# Patient Record
Sex: FEMALE
[De-identification: names, ages, dates, MRNs, and addresses within clinical notes are randomized per-mention and may not be internally consistent; named-entity substitution may affect disease eponyms.]

---

## 2019-04-17 ENCOUNTER — HOSPITAL ENCOUNTER (EMERGENCY)
Dept: HOSPITAL 97 - ER | Age: 24
Discharge: HOME | End: 2019-04-17
Payer: COMMERCIAL

## 2019-04-17 VITALS — TEMPERATURE: 98.2 F | DIASTOLIC BLOOD PRESSURE: 70 MMHG | SYSTOLIC BLOOD PRESSURE: 129 MMHG | OXYGEN SATURATION: 100 %

## 2019-04-17 DIAGNOSIS — Z88.0: ICD-10-CM

## 2019-04-17 DIAGNOSIS — M06.9: ICD-10-CM

## 2019-04-17 DIAGNOSIS — J45.909: ICD-10-CM

## 2019-04-17 DIAGNOSIS — Z88.5: ICD-10-CM

## 2019-04-17 DIAGNOSIS — M22.12: Primary | ICD-10-CM

## 2019-04-17 PROCEDURE — 99283 EMERGENCY DEPT VISIT LOW MDM: CPT

## 2019-04-17 NOTE — EDPHYS
Physician Documentation                                                                           

 [facility]                                                                 

Name: Marisabel Felix                                                                             

Age: 23 yrs                                                                                       

Sex: Female                                                                                       

: 1995                                                                                   

MRN: [id number]                                                                                   

Arrival Date: 2019                                                                          

Time: 07:45                                                                                       

Account#: [id number]                                                                            

Bed 5                                                                                             

Private MD:                                                                                       

ED Physician Vickey Medina                                                                         

HPI:                                                                                              

                                                                                             

07:45 This 23 yrs old  Female presents to ER via Un[facility] with complaints of Knee   rn  

      Pain.                                                                                       

07:45 The patient presents with decreased range of motion, pain, that is acute. The           rn  

      complaints affect the left knee. Onset: The symptoms/episode began/occurred just prior      

      to arrival. Modifying factors: The symptoms are alleviated by straightening leg.            

      Severity of symptoms: At their worst the symptoms were moderate, in the emergency           

      department the symptoms have improved. The patient has experienced similar episodes in      

      the past. REports bending knee and twisting to get into vehicle, her left knee locked       

      up, unable to move it, no pop or blunt trauma, now improved, able to range knee now and     

      just feels sore, has happened before and reports in past able to straighten it and          

      medial pressure resolves it. .                                                              

                                                                                                  

OB/GYN:                                                                                           

07:50 LMP N/A - Birth control method                                                          jl7 

                                                                                                  

Historical:                                                                                       

- Allergies:                                                                                      

07:50 PENICILLINS;                                                                            jl7 

07:50 tramadol;                                                                               jl7 

- Home Meds:                                                                                      

07:50 None [Active];                                                                          jl7 

- PMHx:                                                                                           

07:50 Asthma; Rheumatoid Arthritis;                                                           jl7 

- PSHx:                                                                                           

07:50 None;                                                                                   jl7 

                                                                                                  

- Immunization history:: Adult Immunizations up to date.                                          

- Social history:: Smoking status: Patient/guardian denies using tobacco.                         

- Family history:: not pertinent.                                                                 

- Ebola Screening: : No symptoms or risks identified at this time.                                

- Hospitalizations: : No recent hospitalization is reported.                                      

                                                                                                  

                                                                                                  

ROS:                                                                                              

07:45 Constitutional: Negative for fever, chills, and weight loss, MS/Extremity: + left knee  rn  

      pain and locking Neuro: Negative for weakness, numbness                                     

                                                                                                  

Exam:                                                                                             

07:45 Constitutional:  This is a well developed, well nourished patient who is awake, alert,  rn  

      and in no acute distress. MS/ Extremity:  Pulses equal, no cyanosis.  Neurovascular         

      intact.  Full, normal range of motion.  Equal circumference. NO bony tenderness, no         

      patellar dislocation currently. No palpable cord.                                           

                                                                                                  

Vital Signs:                                                                                      

07:50  / 70; Pulse 85; Resp 16 S; Temp 98.2(O); Pulse Ox 100% on R/A; Weight 70.31 kg   jl7 

      (R); Height 5 ft. 5 in. (165.10 cm) (R); Pain 0/10;                                         

07:50 Body Mass Index 25.79 (70.31 kg, 165.10 cm)                                             jl7 

                                                                                                  

MDM:                                                                                              

07:45 Patient medically screened.                                                             rn  

07:45 Differential diagnosis: dislocation, tendonitis, patella dislocation/subluxation. Data  rn  

      reviewed: vital signs, nurses notes, and as a result, I will discharge patient.             

      Counseling: I had a detailed discussion with the patient and/or guardian regarding: the     

      historical points, exam findings, and any diagnostic results supporting the                 

      discharge/admit diagnosis, the need for outpatient follow up, to return to the              

      emergency department if symptoms worsen or persist or if there are any questions or         

      concerns that arise at home. Response to treatment: the patient's symptoms have             

      markedly improved after treatment. Special discussion: I discussed with the                 

      patient/guardian in detail that at this point there is no indication for admission to       

      the hospital. It is understood, however, that if the symptoms persist or worsen the         

      patient needs to return immediately for re-evaluation. Based on the history and exam        

      findings, there is no indication for further emergent testing or inpatient evaluation.      

      I discussed with the patient/guardian the need to see the orthopedic surgeon for            

      further evaluation of the symptoms. ED course: Most likely subluxation of patella,          

      given repeated episodes and improvement with straightening leg and medial pressure, no      

      subluxation now, no trauma to suggest bony injury, discussed with patient that xray         

      does not evaluate structures other than bone, and she declines xray. Suggest knee brace     

      for patellar stabilization and ortho f/u. .                                                 

                                                                                                  

Administered Medications:                                                                         

No medications were administered                                                                  

                                                                                                  

                                                                                                  

Disposition:                                                                                      

19 07:50 Discharged to Home. Impression: Recurrent subluxation of patella, left knee.       

- Condition is Stable.                                                                            

- Discharge Instructions: Patellar Dislocation.                                                   

                                                                                                  

- Medication Reconciliation Form, Thank You Letter, Antibiotic Education, Prescription            

  Opioid Use form.                                                                                

- Follow up: Evan Arcos MD; When: As needed; Reason: Recheck today's complaints,              

  Re-evaluation by your physician.                                                                

- Problem is new.                                                                                 

- Symptoms have improved.                                                                         

                                                                                                  

                                                                                                  

                                                                                                  

Signatures:                                                                                       

Vickey Medina MD MD rn Leal, Jahala, RN                        RN   jl7                                                  

                                                                                                  

Corrections: (The following items were deleted from the chart)                                    

07:57 07:50 2019 07:50 Discharged to Home. Impression: Recurrent subluxation of         jl7 

      patella, left knee. Condition is Stable. Forms are Medication Reconciliation Form,          

      Thank You Letter, Antibiotic Education, Prescription Opioid Use. Follow up: Dr. Evan Acros; When: As needed; Reason: Recheck today's complaints, Re-evaluation by your            

      physician. Problem is new. Symptoms have improved. rn                                       

                                                                                                  

**************************************************************************************************

## 2019-04-17 NOTE — ER
Nurse's Notes                                                                                     

 [facility]                                                                 

Name: Marisabel Felix                                                                             

Age: 23 yrs                                                                                       

Sex: Female                                                                                       

: 1995                                                                                   

MRN: [id number]                                                                                   

Arrival Date: 2019                                                                          

Time: 07:45                                                                                       

Account#: [id number]                                                                            

Bed 5                                                                                             

Private MD:                                                                                       

Diagnosis: Recurrent subluxation of patella, left knee                                            

                                                                                                  

Presentation:                                                                                     

                                                                                             

07:48 Presenting complaint: EMS states: Pt getting into car and left knee locked up.          jl7 

      Transition of care: patient was not received from another setting of care. Onset of         

      symptoms was 2019. Risk Assessment: Do you want to hurt yourself or someone       

      else? Patient reports no desire to harm self or others. Initial Sepsis Screen: Does the     

      patient meet any 2 criteria? No. Patient's initial sepsis screen is negative. Does the      

      patient have a suspected source of infection? No. Patient's initial sepsis screen is        

      negative. Care prior to arrival: None.                                                      

07:48 Method Of Arrival: EMS: [facility]                                                       jl7 

07:48 Acuity: HALIE 4                                                                           jl7 

                                                                                                  

Triage Assessment:                                                                                

07:50 General: Appears in no apparent distress. uncomfortable, Behavior is calm, cooperative, jl7 

      appropriate for age. Pain: Denies pain. EENT: No signs and/or symptoms were reported        

      regarding the EENT system. Neuro: Level of Consciousness is awake, alert, obeys             

      commands, Oriented to person, place, time, situation. Cardiovascular: Patient's skin is     

      warm and dry. Respiratory: Airway is patent Respiratory effort is even, unlabored,          

      Respiratory pattern is regular, symmetrical. GI: No signs and/or symptoms were reported     

      involving the gastrointestinal system. : No signs and/or symptoms were reported           

      regarding the genitourinary system. Derm: Skin is pink, warm \T\ dry. Musculoskeletal:      

      Range of motion: limited in left knee.                                                      

                                                                                                  

OB/GYN:                                                                                           

07:50 LMP N/A - Birth control method                                                          jl7 

                                                                                                  

Historical:                                                                                       

- Allergies:                                                                                      

07:50 PENICILLINS;                                                                            jl7 

07:50 tramadol;                                                                               jl 

- Home Meds:                                                                                      

07:50 None [Active];                                                                          jl 

- PMHx:                                                                                           

07:50 Asthma; Rheumatoid Arthritis;                                                           jl7 

- PSHx:                                                                                           

07:50 None;                                                                                   jl7 

                                                                                                  

- Immunization history:: Adult Immunizations up to date.                                          

- Social history:: Smoking status: Patient/guardian denies using tobacco.                         

- Family history:: not pertinent.                                                                 

- Ebola Screening: : No symptoms or risks identified at this time.                                

- Hospitalizations: : No recent hospitalization is reported.                                      

                                                                                                  

                                                                                                  

Screenin:52 Abuse screen: Denies threats or abuse. Denies injuries from another. Nutritional        jl7 

      screening: No deficits noted. Tuberculosis screening: No symptoms or risk factors           

      identified. Fall Risk Gait- Impaired (20 pts.). Total Flower Fall Scale indicates No         

      Risk (0-24 pts).                                                                            

                                                                                                  

Assessment:                                                                                       

07:52 General: See triage assessment.                                                         jl7 

                                                                                                  

Vital Signs:                                                                                      

07:50  / 70; Pulse 85; Resp 16 S; Temp 98.2(O); Pulse Ox 100% on R/A; Weight 70.31 kg   jl7 

      (R); Height 5 ft. 5 in. (165.10 cm) (R); Pain 0/10;                                         

07:50 Body Mass Index 25.79 (70.31 kg, 165.10 cm)                                             jl7 

                                                                                                  

ED Course:                                                                                        

07:45 Patient arrived in ED.                                                                  aa5 

07:45 Vickey Medina MD is Attending Physician.                                                rn  

07:48 Faye Maier, RN is Primary Nurse.                                                      jl7 

07:49 Triage completed.                                                                       jl7 

07:50 Evan Arcos MD is Referral Physician.                                                rn  

07:50 Arm band placed on right wrist.                                                         jl7 

07:52 Patient has correct armband on for positive identification. Bed in low position. Call   jl7 

      light in reach. Side rails up X 1. Pulse ox on. NIBP on.                                    

07:53 No provider procedures requiring assistance completed. Patient did not have IV access   jl7 

      during this emergency room visit.                                                           

                                                                                                  

Administered Medications:                                                                         

No medications were administered                                                                  

                                                                                                  

                                                                                                  

Outcome:                                                                                          

07:50 Discharge ordered by MD.                                                                rn  

07:57 Discharged to home ambulatory.                                                          jl7 

07:57 Condition: stable                                                                           

07:57 Discharge instructions given to patient, Instructed on discharge instructions, follow       

      up and referral plans. Demonstrated understanding of instructions, follow-up care.          

07:57 Patient left the ED.                                                                    jl7 

                                                                                                  

Signatures:                                                                                       

Vickey Medina MD MD rn Calderon, Audri, RN RN   aa5                                                  

Faye Maier RN RN   jl7                                                  

                                                                                                  

**************************************************************************************************

## 2019-04-17 NOTE — XMS REPORT
Patient Summary Document

 Created on:2019



Patient:DHEERAJ MELARA

Sex:Female

:1995

External Reference #:[id number]





Demographics







 Address  [address] [place] [id number]

 

 Home Phone  [phone]

 

 Work Phone  [phone]

 

 Email Address  [email or web address]

 

 Preferred Language  Unknown

 

 Marital Status  Unknown

 

 Synagogue Affiliation  Unknown

 

 Race  Unknown

 

 Additional Race(s)  Unavailable

 

 Ethnic Group  Unknown









Author







 Organization  [facility]

 

 Address  [address] [place] [address] [address]. Doss [address] [place] [id number]

 

 Phone  [phone]









Care Team Providers







 Name  Role  Phone

 

 Unavailable  Unavailable  Unavailable









Problems

This patient has no known problems.



Allergies, Adverse Reactions, Alerts

This patient has no known allergies or adverse reactions.



Medications

This patient has no known medications.

## 2022-02-18 ENCOUNTER — HOSPITAL ENCOUNTER (EMERGENCY)
Dept: HOSPITAL 97 - ER | Age: 27
Discharge: HOME | End: 2022-02-18
Payer: COMMERCIAL

## 2022-02-18 VITALS — SYSTOLIC BLOOD PRESSURE: 117 MMHG | DIASTOLIC BLOOD PRESSURE: 89 MMHG

## 2022-02-18 VITALS — TEMPERATURE: 97.9 F

## 2022-02-18 VITALS — OXYGEN SATURATION: 100 %

## 2022-02-18 DIAGNOSIS — Z88.5: ICD-10-CM

## 2022-02-18 DIAGNOSIS — Z88.0: ICD-10-CM

## 2022-02-18 DIAGNOSIS — Z3A.00: ICD-10-CM

## 2022-02-18 DIAGNOSIS — O99.419: Primary | ICD-10-CM

## 2022-02-18 DIAGNOSIS — I47.1: ICD-10-CM

## 2022-02-18 LAB
BUN BLD-MCNC: 9 MG/DL (ref 7–18)
GLUCOSE SERPLBLD-MCNC: 71 MG/DL (ref 74–106)
HCT VFR BLD CALC: 37 % (ref 36–45)
LYMPHOCYTES # SPEC AUTO: 2.2 K/UL (ref 0.7–4.9)
PMV BLD: 9 FL (ref 7.6–11.3)
POTASSIUM SERPL-SCNC: 3.9 MMOL/L (ref 3.5–5.1)
RBC # BLD: 4.55 M/UL (ref 3.86–4.86)
TSH SERPL DL<=0.05 MIU/L-ACNC: 4.15 UIU/ML (ref 0.36–3.74)

## 2022-02-18 PROCEDURE — 96374 THER/PROPH/DIAG INJ IV PUSH: CPT

## 2022-02-18 PROCEDURE — 96361 HYDRATE IV INFUSION ADD-ON: CPT

## 2022-02-18 PROCEDURE — 99285 EMERGENCY DEPT VISIT HI MDM: CPT

## 2022-02-18 PROCEDURE — 80048 BASIC METABOLIC PNL TOTAL CA: CPT

## 2022-02-18 PROCEDURE — 84439 ASSAY OF FREE THYROXINE: CPT

## 2022-02-18 PROCEDURE — 93005 ELECTROCARDIOGRAM TRACING: CPT

## 2022-02-18 PROCEDURE — 36415 COLL VENOUS BLD VENIPUNCTURE: CPT

## 2022-02-18 PROCEDURE — 85025 COMPLETE CBC W/AUTO DIFF WBC: CPT

## 2022-02-18 PROCEDURE — 84443 ASSAY THYROID STIM HORMONE: CPT

## 2022-02-18 NOTE — ER
Nurse's Notes                                                                                     

 [facility]                                                                 

Name: Marisabel Felix                                                                             

Age: 26 yrs                                                                                       

Sex: Female                                                                                       

: 1995                                                                                   

MRN: [id number]                                                                                   

Arrival Date: 2022                                                                          

Time: 10:47                                                                                       

Account#: [id number]                                                                            

Bed 2                                                                                             

Private MD:                                                                                       

Diagnosis: Supraventricular tachycardia                                                           

                                                                                                  

Presentation:                                                                                     

                                                                                             

10:47 Chief complaint: Patient states: Fast heart rate. Pt reports she has had 3 or so        ss  

      episodes of having a fast heart rate, but it would go away after laying down                

      momentarily, but this time is not going away. Coronavirus screen: Client denies travel      

      out of the [place] in the last 14 days. Ebola Screen: Patient denies exposure to               

      infectious person. Patient denies travel to an Ebola-affected area in the 21 days           

      before illness onset. Initial Sepsis Screen: Does the patient meet any 2 criteria? RR >     

      20 per min. HR > 90 bpm. No. Patient's initial sepsis screen is negative. Does the          

      patient have a suspected source of infection? No. Patient's initial sepsis screen is        

      negative. Risk Assessment: Do you want to hurt yourself or someone else? Patient            

      reports no desire to harm self or others. Onset of symptoms was 2022.          

10:47 Method Of Arrival: Ambulatory                                                           ss  

10:47 Acuity: HALIE 2                                                                           ss  

                                                                                                  

OB/GYN:                                                                                           

13:12 LMP N/A - currently pregnant                                                            ke1 

                                                                                                  

Historical:                                                                                       

- Allergies:                                                                                      

11:00 PENICILLINS;                                                                            ss  

11:00 tramadol;                                                                               ss  

- Home Meds:                                                                                      

11:00 None [Active];                                                                          ss  

- PMHx:                                                                                           

11:00 Asthma; Rheumatoid Arthritis;                                                           ss  

- PSHx:                                                                                           

11:00 None;                                                                                   ss  

                                                                                                  

- Immunization history:: Client reports having NOT received the Covid vaccine.                    

- Social history:: Smoking status: Patient denies any tobacco usage or history of.                

- Family history:: not pertinent.                                                                 

- Hospitalizations: : No recent hospitalization is reported.                                      

                                                                                                  

                                                                                                  

Screenin:11 Abuse screen: Denies threats or abuse. Nutritional screening: No deficits noted.        ke1 

      Tuberculosis screening: No symptoms or risk factors identified. Fall Risk None              

      identified.                                                                                 

                                                                                                  

Assessment:                                                                                       

11:09 General: Appears uncomfortable, Behavior is calm, cooperative, quiet. Neuro: No         ke1 

      deficits noted.                                                                             

11:09 Cardiovascular: Reports palpitations, fast heart rate Capillary refill < 3 seconds      ke1 

      Patient's skin is warm and dry. Pulses are all present. Rhythm is SVT. Respiratory:         

      Airway is patent. GI: Abdomen is round pregnant. :. Derm: Skin is intact.                 

      Musculoskeletal: Circulation, motion, and sensation intact.                                 

11:09 Pain: Denies pain.                                                                      ke1 

11:10 Cardiovascular: Rhythm is SVT.                                                          ke1 

11:17 Cardiovascular: Rhythm is sinus rhythm.                                                 ke1 

11:34 Neuro: No deficits noted.                                                               ke1 

11:34 Reassessment: Patient states feeling better. Patient states symptoms have improved.     ke1 

      Cardiovascular: Rhythm is sinus rhythm.                                                     

12:25 Reassessment: No changes from previously documented assessment.                         ke1 

12:50 Reassessment: No changes from previously documented assessment. Patient and/or family   jd3 

      updated on plan of care and expected duration. Pain level reassessed. Patient is alert,     

      oriented x 3, equal unlabored respirations, skin warm/dry/pink.                             

                                                                                                  

Vital Signs:                                                                                      

10:47  / 72; Pulse 180; Resp 22; Temp 97.9(TE); Pulse Ox 100% on R/A; Weight 72.57 kg;  ss  

      Height 5 ft. 5 in. (165.10 cm); Pain 0/10;                                                  

11:11  / 86; Pulse 171;                                                                 ke1 

11:12  / 93; Pulse 122; Resp 20; Pulse Ox 100% ;                                        ke1 

11:13  / 79; Pulse 94;                                                                  ke1 

11:13  / 85; Pulse 102; Resp 19; Pulse Ox 100% on R/A;                                  ke1 

12:06  / 87; Pulse 75; Resp 18;                                                         ke1 

12:50  / 89; Pulse 78; Resp 16 S; Pulse Ox 99% on R/A;                                  jd3 

13:03  / 89; Pulse 83; Resp 16; Pulse Ox 100% on R/A;                                   ke1 

10:47 Body Mass Index 26.63 (72.57 kg, 165.10 cm)                                               

                                                                                                  

Vitals:                                                                                           

11:14 Fetal Heart Tones 154.                                                                  ke1 

12:25 Cardiac Rhythm Assessment Sinus rhythm.                                                 ke1 

                                                                                                  

ED Course:                                                                                        

10:47 Patient arrived in ED.                                                                  mr  

10:48 Vickey Medina MD is Attending Physician.                                                rn  

10:59 EKG done, by ED staff, reviewed by Vickey Medina MD.                                      em1 

11:00 Triage completed.                                                                       ss  

11:00 Ebrottie, Kouassi, RN is Primary Nurse.                                                 ke1 

11:01 Inserted saline lock: 20 gauge in left antecubital area, using aseptic technique.       ke1 

11:09 Arm band placed on right wrist.                                                         ke1 

13:11 No provider procedures requiring assistance completed. IV discontinued.                 ke1 

13:11 Patient has correct armband on for positive identification.                             ke1 

                                                                                                  

Administered Medications:                                                                         

11:11 Drug: NS 0.9% 1000 ml Route: IV; Rate: 1000 ml; Site: left antecubital;                 ke1 

12:04 Follow up: IV Status: Completed infusion; IV Intake: 500ml                              ke1 

11:11 Drug: Adenosine 6 mg Route: IVP; Site: left antecubital;                                ke1 

11:13 Follow up:  / 79; Pulse 94 bpm; Response: Marked relief of symptoms; Other        ke1 

11:30 CANCELLED (Other Intervention Used): Adenosine 12 mg IVP once                           ke1 

                                                                                                  

                                                                                                  

Intake:                                                                                           

12:04 IV: 500ml; Total: 500ml.                                                                ke1 

                                                                                                  

Outcome:                                                                                          

12:57 Discharge ordered by MD.                                                                rn  

13:11 Discharged to home ambulatory.                                                          ke1 

13:11 Condition: improved                                                                         

13:11 Discharge instructions given to patient.                                                    

13:16 Patient left the ED.                                                                    ke1 

                                                                                                  

Signatures:                                                                                       

Jina Munoz Roman, MD MD   rn                                                   

Alejandro Marina Shelby, RN RN ss Davies, Jonathon, RN RN jd3 Ebrottie, Kouassi, RN                   RN   ke1                                                  

                                                                                                  

Corrections: (The following items were deleted from the chart)                                    

11:02 11:01 Inserted saline lock: 22 gauge in left antecubital area, using aseptic technique. ke1 

      ke1                                                                                         

11:30 11:11 Adenosine 6 mg IVP in left antecubital ke1                                        ke1 

11:41 11:09 Cardiovascular: Reports ke1                                                       ke1 

13:12 11:00 Arm band placed on right wrist. ss                                                ke1 

                                                                                                  

**************************************************************************************************

## 2022-02-18 NOTE — XMS REPORT
Continuity of Care Document

                          Created on:2022



Patient:DHEERAJ MELARA

Sex:Female

:1995

External Reference #:[id number]





Demographics







                          Address                   [address] [place] [address]

 

                          Home Phone                [phone]

 

                          Work Phone                [phone]

 

                          Mobile Phone              [phone]

 

                          Email Address             [email or web address]

 

                          Preferred Language        en

 

                          Marital Status            Unknown

 

                          Episcopalian Affiliation     Unknown

 

                          Race                      Unknown

 

                          Additional Race(s)        White

 

                          Ethnic Group              Unknown









Author







                          Organization              [facility]

t

 

                          Address                   [address]



                                                    [place] [address]

 

                          Phone                     [phone]









Support







                Name            Relationship    Address         Phone

 

                Hever      Mother          [address]    [phone]



                                                [place] [address] 

 

                Dwyer        Life Partner    [phone]

[phone]



                                                [place] [address] 









Care Team Providers







                    Name                Role                Phone

 

                    LIBBY Brooke Jr.      Primary Care Physician [phone]

 

                    CAROLINE ADAMES           Attending Clinician Unavailable

 

                    Caroline Adames MD        Attending Clinician [phone]

 

                    Pob,  Lab Main      Attending Clinician Unavailable

 

                    Jazlyn CHING       Attending Clinician [phone]

 

                    Ultrasound          Attending Clinician Unavailable

 

                    INNA Andrade MD      Attending Clinician [phone]

 

                    INNA ANDRADE         Attending Clinician Unavailable









Payers







           Payer Name Policy Type Policy Number Effective Date Expiration Date S

ourjenn

 

           [facility]            [id number]  2021-10-01 00:00:00            







Problems







       Condition Condition Condition Status Onset  Resolution Last   Treating Co

mments 

Source



       Name   Details Category        Date   Date   Treatment Clinician        



                                                 Date                 

 

       High risk High risk Disease Active                              Uni

vers



       pregnancy, pregnancy,               1-07                               it

y of



       antepartum antepartum               00:00:                             [place]



                                   00                                 [facility]



                                                                      Branch

 

       History of History of Disease Active 2021                             U

nivers



       anxiety anxiety               0-15                               ity of



                                   00:00:                             [place]



                                   00                                 Medical



                                                                      Branch

 

       Nausea Nausea Disease Active 2021                             Univers



                                   0-15                               ity of



                                   00:00:                             [place]



                                   00                                 Medical



                                                                      Branch

 

       Moderate Moderate Disease Active                              Unive

rs



       dysplasia dysplasia               3-30                               ity 

of



       of cervix of cervix               00:00:                             [place]

[place]



       (JOSE II) (JOSE II)               00                                 Medica

l



                                                                      Branch

 

       Papanicola Papanicola Disease Active                              U

nivers



       ou smear ou smear               3-23                               ity of



       of cervix of cervix               00:00:                             [place]

[place]



       with high with high               00                                 Medi

marcia



       grade  grade                                                   Branch



       squamous squamous                                                  



       intraepith intraepith                                                  



       elial  elial                                                   



       lesion lesion                                                  



       (HGSIL) (HGSIL)                                                  

 

       Cervical Cervical Disease Active                              Unive

rs



       high risk high risk               3-23                               ity 

of



       human  human                00:00:                             [place]



       papillomav papillomav               00                                 Me

dical



       irus (HPV) irus (HPV)                                                  Br

anch



       DNA test DNA test                                                  



       positive positive                                                  

 

       Asthma Asthma Disease Active 2014                      Overview: Univer

s



                                   0-13                        Formattin ity of



                                   00:00:                      g of this [place]



                                   00                          note   Medical



                                                               might be Branch



                                                               different 



                                                               from the 



                                                               original. 



                                                               ICD10  



                                                               Diagnosis 



                                                               Term   



                                                               Replacer 



                                                               Utility 







Allergies, Adverse Reactions, Alerts







       Allergy Allergy Status Severity Reaction(s) Onset  Inactive Treating Comm

ents 

Source



       Name   Type                        Date   Date   Clinician        

 

       Penicill Propensi Active        Hives  2014                      Univer

s



       ins    ty to                       0-13                        ity of



              adverse                      00:00:                      [place]



              reaction                      00                          Medical



              s                                                       Branch

 

       Tramadol Propensi Active        Anaphylaxis 2014                      U

nivers



              ty to                       0-13                        ity of



              adverse                      00:00:                      [place]



              reaction                      00                          Medical



              s                                                       Branch

 

       PENICILL Drug   Active        Hives  2014                      Univers



       INS    Class                       0-13                        ity of



                                          00:00:                      [place]



                                          00                          Medical



                                                                      Branch

 

       TRAMADOL DRUG   Active        Anaphylaxis 2014                      Uni

vers



              INGREDI                      0-13                        ity of



                                          00:00:                      [address]







Social History







           Social Habit Start Date Stop Date  Quantity   Comments   Source

 

           ASSERTION  2021            Pregnant              [facility]



                      00:00:00                                    [facility]

 

           Alcohol intake 2022 Ex-drinker            [facility]



                      00:00:00   00:00:00   (finding)             [facility]

 

           Sex Assigned At 1995                       Univers[facility]

y of



           Birth      00:00:00   00:00:00                         [facility]









                Smoking Status  Start Date      Stop Date       Source

 

                Never smoker                                    [facility]







Medications







       Ordered Filled Start  Stop   Current Ordering Indication Dosage Frequency

 Signature

                    Comments            Components          Source



     Medication Medication Date Date Medication? Clinician                (SIG) 

          



     Name Name                                                   

 

     Nitrofurant            Yes       [id number] 100mg      Take 1         

  Univers



     oin&Nit.      1-18                               capsule by           landen solorio

f



     Macrocryst      00:00:                               mouth           [place]



     (MACROBID)      00                                 daily.           Medical



     100 mg                                                        Branch



     capsule                                                        

 

     Nitrofurant            Yes       [id number] 100mg      Take 1         

  Univers



     oin&Nit.      1-18                               capsule by           ity o

f



     Macrocryst      00:00:                               mouth           [place]



     (MACROBID)      00                                 daily.           Medical



     100 mg                                                        Branch



     capsule                                                        

 

     Nitrofurant            Yes       [id number] 100mg      Take 1         

  Univers



     oin&Nit.      1-18                               capsule by           ity o

f



     Macrocryst      00:00:                               mouth           [place]



     (MACROBID)      00                                 daily.           Medical



     100 mg                                                        Branch



     capsule                                                        

 

     Nitrofurant            Yes       [id number] 100mg      Take 1         

  Univers



     oin&Nit.      1-18                               capsule by           ity o

f



     Macrocryst      00:00:                               mouth           [place]



     (MACROBID)      00                                 daily.           Medical



     100 mg                                                        Branch



     capsule                                                        

 

     Nitrofurant            Yes       [id number] 100mg      Take 1         

  Univers



     oin&Nit.      1-18                               capsule by           ity o

f



     Macrocryst      00:00:                               mouth           [place]



     (MACROBID)      00                                 daily.           Medical



     100 mg                                                        Branch



     capsule                                                        

 

     Nitrofurant            Yes       [id number] 100mg      Take 1         

  Univers



     oin&Nit.      1-18                               capsule by           ity o

f



     Macrocryst      00:00:                               mouth           [place]



     (MACROBID)      00                                 daily.           Medical



     100 mg                                                        Branch



     capsule                                                        

 

     Nitrofurant      2022- Yes       [id number] 100mg      Take 1        

   Univers



     oin&Nit.      1-10 -18                          capsule by           ity 

of



     Macrocryst      00:00: 05:59                          mouth 2           Fab

as



     (MACROBID)      00   :00                           (two)           Medical



     100 mg                                         times           Branch



     capsule                                         daily for           



                                                  7 days.           

 

     Nitrofurant      2022- Yes       [id number] 100mg      Take 1        

   Univers



     oin&Nit.      1-10 -18                          capsule by           ity 

of



     Macrocryst      00:00: 05:59                          mouth 2           Fab

as



     (MACROBID)      00   :00                           (two)           Medical



     100 mg                                         times           Branch



     capsule                                         daily for           



                                                  7 days.           

 

     cephALEXin      2021- Yes       [id number] 500mg      Take 1          

 Univers



     500 mg                                capsule by           ity of



     capsule      00:00: 05:59                          mouth 4           [place]



               00   :00                           (four)           Medical



                                                  times           Branch



                                                  daily for           



                                                  7 days.           

 

     cephALEXin      2021- No        [id number] 500mg      Take 1          

 Univers



     500 mg      1-08 12-10                          capsule by           ity of



     capsule      00:00: 00:00                          mouth 2           [place]



               00   :00                           (two)           Medical



                                                  times           Branch



                                                  daily.           

 

     prenatal            Yes                      Take  by           Unive

rs



     vit       9-30                               mouth.           ity of



     calc,iron,f      14:37:                                              [place]



     olic      06                                                Medical



     (PRENATAL                                                        Branch



     VITAMIN                                                        



     ORAL)                                                        

 

     prenatal            Yes                      Take  by           Unive

rs



     vit       9-30                               mouth.           ity of



     calc,iron,f      14:37:                                              [address]



     (PRENATAL                                                        Branch



     VITAMIN                                                        



     ORAL)                                                        

 

     prenatal            Yes                      Take  by           Unive

rs



     vit       9-30                               mouth.           ity of



     calc,iron,f      14:37:                                              [address]



     (PRENATAL                                                        Branch



     VITAMIN                                                        



     ORAL)                                                        

 

     prenatal            Yes                      Take  by           Unive

rs



     vit       9-30                               mouth.           ity of



     calc,iron,f      14:37:                                              [address]



     (PRENATAL                                                        Branch



     VITAMIN                                                        



     ORAL)                                                        

 

     prenatal            Yes                      Take  by           Unive

rs



     vit       9-30                               mouth.           ity of



     calc,iron,f      14:37:                                              [address]



     (PRENATAL                                                        Branch



     VITAMIN                                                        



     ORAL)                                                        

 

     prenatal            Yes                      Take  by           Unive

rs



     vit       9-30                               mouth.           ity of



     calc,iron,f      14:37:                                              [address]



     (PRENATAL                                                        Branch



     VITAMIN                                                        



     ORAL)                                                        

 

     prenatal            Yes                      Take  by           Unive

rs



     vit       9-30                               mouth.           ity of



     calc,iron,f      14:37:                                              [address]



     (PRENATAL                                                        Branch



     VITAMIN                                                        



     ORAL)                                                        

 

     prenatal            Yes                      Take  by           Unive

rs



     vit       9-30                               mouth.           ity of



     calc,iron,f      14:37:                                              [address]



     (PRENATAL                                                        Branch



     VITAMIN                                                        



     ORAL)                                                        

 

     prenatal            Yes                      Take  by           Unive

rs



     vit       9-30                               mouth.           ity of



     calc,iron,f      14:37:                                              [address]



     (PRENATAL                                                        Branch



     VITAMIN                                                        



     ORAL)                                                        

 

     prenatal            Yes                      Take  by           Unive

rs



     vit       9-30                               mouth.           ity of



     calc,iron,f      14:37:                                              [address]



     (PRENATAL                                                        Branch



     VITAMIN                                                        



     ORAL)                                                        

 

     ALBUTEROL            Yes                      Inhale.           Unive

rs



     INHALE      6-21                                              ity of



               11:26:                                              [address]

 

     ALBUTEROL      2018-      Yes                      Inhale.           Unive

rs



     INHALE      6-21                                              ity of



               11:26:                                              [address]

 

     ALBUTEROL      2018-0      Yes                      Inhale.           Unive

rs



     INHALE      6-21                                              ity of



               11:26:                                              [address]

 

     ALBUTEROL      2018-0      Yes                      Inhale.           Unive

rs



     INHALE      6-21                                              ity of



               11:26:                                              [address]

 

     ALBUTEROL      2018-0      Yes                      Inhale.           Unive

rs



     INHALE      6-21                                              ity of



               11:26:                                              [address]

 

     ALBUTEROL      2018-0      Yes                      Inhale.           Unive

rs



     INHALE      6-21                                              ity of



               11:26:                                              [address]

 

     ALBUTEROL      2018-0      Yes                      Inhale.           Unive

rs



     INHALE      6-21                                              ity of



               11:26:                                              [address]

 

     ALBUTEROL      -0      Yes                      Inhale.           Unive

rs



     INHALE      6-21                                              ity of



               11:26:                                              [address]

 

     ALBUTEROL      2018-0      Yes                      Inhale.           Unive

rs



     INHALE      6-21                                              ity of



               11:26:                                              [address]

 

     ALBUTEROL      0      Yes                      Inhale.           Unive

rs



     INHALE      6-21                                              ity of



               11:26:                                              [address]







Immunizations







           Ordered    Filled Immunization Date       Status     Comments   [facility]

e



           Immunization Name Name                                        

 

           TDAP                  2016 Completed             [facility]



                                 00:00:00                         [facility]

 

           Influenza Virus            2016 Completed             Universit

y of



           Vaccine Quad IM 3+            00:00:00                         [facility]

 

           TDAP                  2016 Completed             [facility]



                                 00:00:00                         [facility]

 

           Influenza Virus            2016 Completed             Universit

y of



           Vaccine Quad IM 3+            00:00:00                         [facility]

 

           TDAP                  2016 Completed             [facility]



                                 00:00:00                         [facility]

 

           Influenza Virus            2016 Completed             Universit

y of



           Vaccine Quad IM 3+            00:00:00                         [facility]

 

           TDAP                  2016 Completed             [facility]



                                 00:00:00                         [facility]

 

           Influenza Virus            2016 Completed             Universit

y of



           Vaccine Quad IM 3+            00:00:00                         [facility]

 

           TDAP                  2016 Completed             [facility]



                                 00:00:00                         [facility]

 

           Influenza Virus            2016 Completed             Universit

y of



           Vaccine Quad IM 3+            00:00:00                         [facility]

 

           TDAP                  2016 Completed             [facility]



                                 00:00:00                         [facility]

 

           Influenza Virus            2016 Completed             Universit

y of



           Vaccine Quad IM 3+            00:00:00                         [facility]

 

           TDAP                  2016 Completed             [facility]



                                 00:00:00                         [facility]

 

           Influenza Virus            2016 Completed             Universit

y of



           Vaccine Quad IM 3+            00:00:00                         [facility]

 

           TDAP                  2016 Completed             [facility]



                                 00:00:00                         [facility]

 

           Influenza Virus            2016 Completed             Universit

y of



           Vaccine Quad IM 3+            00:00:00                         [facility]

 

           TDAP                  2016 Completed             [facility]



                                 00:00:00                         [facility]

 

           Influenza Virus            2016 Completed             Universit

y of



           Vaccine Quad IM 3+            00:00:00                         [facility]

 

           TDAP                  2016 Completed             [facility]



                                 00:00:00                         [facility]

 

           Influenza Virus            2016 Completed             Universit

y of



           Vaccine Quad IM 3+            00:00:00                         [facility]

 

           MMR                   2014-10-15 Completed             [facility]



                                 00:00:00                         [facility]

 

           MMR                   2014-10-15 Completed             [facility]



                                 00:00:00                         [facility]

 

           MMR                   2014-10-15 Completed             [facility]



                                 00:00:00                         [facility]

 

           MMR                   2014-10-15 Completed             [facility]



                                 00:00:00                         [facility]

 

           MMR                   2014-10-15 Completed             [facility]



                                 00:00:00                         [facility]

 

           MMR                   2014-10-15 Completed             [facility]



                                 00:00:00                         [facility]

 

           MMR                   2014-10-15 Completed             [facility]



                                 00:00:00                         [facility]

 

           MMR                   2014-10-15 Completed             [facility]



                                 00:00:00                         [facility]

 

           MMR                   2014-10-15 Completed             [facility]



                                 00:00:00                         [facility]

 

           MMR                   2014-10-15 Completed             [facility]



                                 00:00:00                         [facility]

 

           Td                    2009 Completed             [facility]



                                 00:00:00                         [facility]

 

           Td                    2009 Completed             [facility]



                                 00:00:00                         [facility]

 

           Td                    2009 Completed             [facility]



                                 00:00:00                         [facility]

 

           Td                    2009 Completed             [facility]



                                 00:00:00                         [facility]

 

           Td                    2009 Completed             [facility]



                                 00:00:00                         [facility]

 

           Td                    2009 Completed             [facility]



                                 00:00:00                         [facility]

 

           Td                    2009 Completed             [facility]



                                 00:00:00                         [facility]

 

           Td                    2009 Completed             [facility]



                                 00:00:00                         [facility]

 

           Td                    2009 Completed             [facility]



                                 00:00:00                         [facility]

 

           Td                    2009 Completed             [facility]



                                 00:00:00                         [facility]







Vital Signs







             Vital Name   Observation Time Observation Value Comments     Source

 

             Diastolic blood 2022 20:04:00 69 mm[Hg]                 [facility]

[facility]ity of



             pressure                                            [facility]



                                                                 [facility]

 

             Heart rate   2022 20:04:00 66 /min                   Universi

ty of



                                                                 [facility]



                                                                 [facility]

 

             Body temperature 2022 20:04:00 36.44 Louise                 Univ

ersity of



                                                                 [facility]



                                                                 [facility]anch

 

             Respiratory rate 2022 20:04:00 18 /min                   Univ

ersity of



                                                                 [facility]



                                                                 [facility]anch

 

             Body height  2022 20:04:00 165.1 cm                  Universi

ty of



                                                                 [facility]



                                                                 [facility]anch

 

             Body weight  2022 20:04:00 72.576 kg                 Universi

ty of



                                                                 [facility]



                                                                 [facility]anch

 

             BMI          2022 20:04:00 26.63 kg/m2               Universi

ty of



                                                                 [facility]



                                                                 [facility]anch

 

             Systolic blood 2022 20:04:00 107 mm[Hg]                Univer

sity of



             pressure                                            [facility]



                                                                 [facility]anch

 

             Systolic blood 2022 20:38:00 110 mm[Hg]                Univer

sity of



             pressure                                            [facility]



                                                                 [facility]anch

 

             Diastolic blood 2022 20:38:00 73 mm[Hg]                 Unive

rsity of



             pressure                                            [facility]



                                                                 [facility]anch

 

             Heart rate   2022 20:38:00 82 /min                   Universi

ty of



                                                                 [facility]



                                                                 [facility]

 

             Body temperature 2022 20:38:00 36.44 Louise                 Univ

ersity of



                                                                 [facility]



                                                                 [facility]anch

 

             Respiratory rate 2022 20:38:00 18 /min                   Univ

ersity of



                                                                 [facility]



                                                                 [facility]anch

 

             Body height  2022 20:38:00 165.1 cm                  Universi

ty of



                                                                 [facility]



                                                                 [facility]anch

 

             Body weight  2022 20:38:00 70.761 kg                 Universi

ty of



                                                                 [facility]



                                                                 [facility]

 

             BMI          2022 20:38:00 25.96 kg/m2               Universi

ty of



                                                                 [facility]



                                                                 [facility]

 

             Systolic blood 2021-12-10 18:32:00 111 mm[Hg]                Univer

sity of



             pressure                                            [facility]



                                                                 [facility]anch

 

             Diastolic blood 2021-12-10 18:32:00 74 mm[Hg]                 Unive

rsity of



             pressure                                            [facility]



                                                                 [facility]anch

 

             Heart rate   2021-12-10 18:32:00 60 /min                   Universi

ty of



                                                                 [facility]



                                                                 Branch

 

             Body temperature 2021-12-10 18:32:00 36.89 Louise                 [facility]

 

             Respiratory rate 2021-12-10 18:32:00 18 /min                   [facility]

 

             Body height  2021-12-10 18:32:00 165.1 cm                  [facility]

 

             Body weight  2021-12-10 18:32:00 68.629 kg                 [facility]

 

             BMI          2021-12-10 18:32:00 25.18 kg/m2               [facility]







Procedures







                Procedure       Date / Time     Performing Clinician Source



                                Performed                       

 

                2 HR GLUCOSE TOLERANCE 2022 17:40:00 Britany Hobson [facility]

 

                1 HR GLUCOSE TOLERANCE 2022 16:38:00 Britany Hobson [facility]

 

                GLUCOSE FASTING 2022 15:38:00 [facility]

f [facility]

 

                GLYCOSYLATED HEMOGLOBIN 2022 15:38:00 Jazlyn Britany [facility]



                (A1C)                                           [facility]

 

                POCT URINALYSIS W/O 2022 00:00:00 Jude Adames   [facility]



                SPECIFIC GRAVITY                                 [facility]

 

                POCT URINALYSIS W/O 2022 00:00:00 Jude Adames   [facility]



                SPECIFIC GRAVITY                                 [facility]

 

                POCT URINALYSIS W/O 2021-12-10 18:38:00 Jude Adames   [facility]

[facility] [place]



                SPECIFIC GRAVITY                                 [facility]







Encounters







        Start   End     Encounter Admission Attending Care    Care    Encounter 

Source



        Date/Time Date/Time Type    Type    Clinicians Facility Department ID   

   

 

        2022 Outpatient R       JUDE ADAMES [facility]    [id number]

5Q-20 Univers



        12:45:00 12:45:00                                         [id number]  [facility]

 

        2022 Outpatient R       JUDE ADAMES [facility]    [id number]

[id number] Univers



        12:45:00 12:45:00                                                 [facility]

 

        2022 Telephone         Jude Adames [facility]    [id number]

[id number] Univers



        00:00:00 00:00:00                 Caroline TAYLOR [id number]         i

ty of



                                                [place] [id number]         [place]

[place]



                                                [place] [phone]         Me

dical



                                                NAL     134             [facility]                 

 

        2022 Outpatient R       JUDE ADAMES [facility]    [id number]

[id number] Univers



        14:00:00 14:35:07                                                 ity of



                                                                        [facility]

 

        2022 Routine         Jude Adames [facility] [id number]

[id number] Univers



        14:00:00 14:35:07 Prenatal         Caroline ROJAS 350.1.13.10         i

ty of



                        Visit                   WOMEN'S [id number]         [place]

[place]



                                                HE[place]  [phone]         [address]

 

        2022 Technician         Perri, [facility]    1.2.8

[id number] 

Univers



        10:45:00 11:00:00 Visit           Britany Hobson 350.1.13.10  

       ity [facility]



                                                [facility] [id number]         [place]

[place]



                                                PROFESS[place] [phone]         Me

dical



                                                NAL     353             [facility]                 

 

        2022 Outpatient R       ROSANGELA [facility]    [id number]

5Q-20 Univers



        10:45:00 10:45:00                                         [id number]  ity of



                                                                        [facility]

 

        2022 Telephone         Jude Adames [facility] [id number] 

[id number] 

Univers



        00:00:00 00:00:00                 Caroline ROJAS [id number]         it

y of



                                                PEDIATRIC [id number]         [place]

[place]



                                                CLINIC  [phone]         [address]

 

        2022-01-10 2022-01-10 Case            Jude Adames [facility] [id number]

[id number] Univers



        00:00:00 00:00:00 Management         Caroline ROJAS 350.1.13.10        

 ity of



                                                WOMEN'S [id number]         [place]

[place]



                                                HEALTH  [phone]         [address]

 

        2022-01-10 2022-01-10 Telephone         Jude Adames [facility] [id number] 

[id number] 

Univers



        00:00:00 00:00:00                 Caroline ROJAS 350.1.13.10         it

y of



                                                WOMEN'S [id number]         [place]

[place]



                                                HEALTH  [phone]         [address]

 

        2022 Outpatient R       JUDE ADAMES [facility]    [id number]

[id number] Univers



        14:30:00 14:50:20                                                 ity of



                                                                        [facility]

 

        2022 Routine         Rosangela Jude [facility] 1.2.840.114 [id number]

[id number] Univers



        14:30:00 14:50:20 Prenatal         Caroline ROJAS 350.1.13.10         i

ty of



                        Visit                   WOMEN'S [id number]         [place]

[place]



                                                HEALTH  [phone]         [address]

 

        2022 Technician         Ultrasound, [facility]    1.2

.840.114 [id number] 

Univers



        14:45:00 15:37:27 Visit           Andrés Andrade [facility]  350.1.13.10 

        ity of



                                                REGIONAL [id number]         Fab

as



                                                MATERNAL [phone]         Med

ical



                                                & CHILD [address] [place] [address]                 

 

        2022 Outpatient R               [facility]    [id number]

-[id number] Univers



        14:45:00 14:45:00                                         [id number]  ity [facility]

 

        2022 Outpatient P       RAYMOND [facility]    [id number]

[id number] Univers



        14:45:00 14:45:00                 [facility]

 

        2021 Case            Jazlyn [facility]    [id number]

[id number] Univers



        00:00:00 00:00:00 Management         Britany   CLAUDIA 350.1.13.10       

  ity [facility] [id number]         [place]

[place]



                                                PROFESS[place] [phone]         Me

dical



                                                [address]                 

 

        2021-12-10 2021-12-10 Routine         Jude Adames [facility] 1.2.840.114 [id number]

[id number] Univers



        12:10:28 12:51:16 Prenatal         Caroline ROJAS 350.1.13.10         i

ty of



                        Visit                   WOMEN'S [id number]         [facility]  [phone]         Medi

marcia



                                                CLINIC  134             Branch







Results







           Test Description Test Time  Test Comments Results    Result Comments 

Source









                    POCT URINALYSIS W/O SPECIFIC GRAVITY 2022 20:19:00 









                      Test Item  Value      Reference Range Interpretation Comme

nts









             POCT PH U (test code = 3254) n/a          5-8                      

 

 

             POCT U LEUK EST (test code = 3263) n/a          Negative - Negative

              

 

             POCT U NIT (test code = 3262) n/a          Negative - Negative     

         

 

             POCT U PROT (test code = 3259) thien          Negative - Negative    

          

 

             POCT U GLU (test code = 3256) neg          Negative - Negative     

         

 

             POCT U KETONE (test code = 3258) n/a          Negative - Negative  

            

 

             POCT U BLD (test code = 3257) n/a          Negative - Negative     

         



[facility] [id number]
 21:52:00





             Test Item    Value        Reference Range Interpretation Comments

 

             POCT PH U (test code = 3254) N/A          5-8                      

 

 

             POCT U LEUK EST (test code = N/A          Negative - Negative      

        



             3263)                                               

 

             POCT U NIT (test code = 3262) N/A          Negative - Negative     

         

 

             POCT U PROT (test code = 3259) Negative     Negative - Negative    

          

 

             POCT U GLU (test code = 3256) Negative     Negative - Negative     

         

 

             POCT U KETONE (test code = 3258) N/A          Negative - Negative  

            

 

             POCT U BLD (test code = 3257) N/A          Negative - Negative     

         



[facility]2021-12-10
 18:38:00





             Test Item    Value        Reference Range Interpretation Comments

 

             POCT PH U (test code = 3254) n/a          5-8                      

 

 

             POCT U LEUK EST (test code = n/a          Negative - Negative      

        



             3263)                                               

 

             POCT U NIT (test code = 3262) n/a          Negative - Negative     

         

 

             POCT U PROT (test code = 3259) Negative     Negative - Negative    

          

 

             POCT U GLU (test code = 3256) Negative     Negative - Negative     

         

 

             POCT U KETONE (test code = 3258) n/a          Negative - Negative  

            

 

             POCT U BLD (test code = 3257) n/a          Negative - Negative     

         

 

             Lab Interpretation (test code = Normal                             

    



             49722-9)                                            



[facility]

## 2022-02-18 NOTE — EDPHYS
Physician Documentation                                                                           

 [facility]                                                                 

Name: Marisabel Felix                                                                             

Age: 26 yrs                                                                                       

Sex: Female                                                                                       

: 1995                                                                                   

MRN: [id number]                                                                                   

Arrival Date: 2022                                                                          

Time: 10:47                                                                                       

Account#: [id number]                                                                            

Bed 2                                                                                             

Private MD:                                                                                       

ED Physician Vickey Medina                                                                         

HPI:                                                                                              

                                                                                             

11:22 This 26 yrs old  Female presents to ER via Ambulatory with complaints of        rn  

      palpitations.                                                                               

11:22 The patient presents with a history of heart racing. Context: The symptoms occur at     rn  

      rest. Onset: The symptoms/episode began/occurred this morning. Duration: The patient or     

      guardian reports a single episode, that is still ongoing. Modifying factors: The            

      symptoms are aggravated by nothing. The symptoms are alleviated by nothing. Associated      

      signs and symptoms: Pertinent positives: lightheadedness, Pertinent negatives: fever,       

      syncope. Severity of symptoms: At their worst the symptoms were moderate in the             

      emergency department the symptoms are unchanged. The patient has experienced similar        

      episodes in the past, a few times. The patient has been recently seen by a physician:.      

      Pt reports fast heart rate, began this morning, has happened a few times during this        

      pregnancy and has mentioned to her doctor but nothing done. Reports this morning            

      started on its own. No vaginal bleeding or abd pain. No trauma. No medication changes.      

      Reports hx of "too much thyroid", but stopped her medication on her own. .                  

                                                                                                  

OB/GYN:                                                                                           

13:12 LMP N/A - currently pregnant                                                            ke1 

                                                                                                  

Historical:                                                                                       

- Allergies:                                                                                      

11:00 PENICILLINS;                                                                            ss  

11:00 tramadol;                                                                               ss  

- Home Meds:                                                                                      

11:00 None [Active];                                                                          ss  

- PMHx:                                                                                           

11:00 Asthma; Rheumatoid Arthritis;                                                           ss  

- PSHx:                                                                                           

11:00 None;                                                                                   ss  

                                                                                                  

- Immunization history:: Client reports having NOT received the Covid vaccine.                    

- Social history:: Smoking status: Patient denies any tobacco usage or history of.                

- Family history:: not pertinent.                                                                 

- Hospitalizations: : No recent hospitalization is reported.                                      

                                                                                                  

                                                                                                  

ROS:                                                                                              

11:22 Constitutional: Negative for fever, chills, and weight loss, Eyes: Negative for injury, rn  

      pain, redness, and discharge, Neck: Negative for injury, pain, and swelling,                

      Cardiovascular: + palpitations Respiratory: Negative for shortness of breath, cough,        

      wheezing, and pleuritic chest pain, Abdomen/GI: Negative for abdominal pain, nausea,        

      vomiting, diarrhea, and constipation, Back: Negative for injury and pain, : Negative      

      for injury, bleeding, discharge, and swelling, MS/Extremity: Negative for injury and        

      deformity, Skin: Negative for injury, rash, and discoloration, Neuro: Negative for          

      headache, numbness, tingling, and seizure.                                                  

                                                                                                  

Exam:                                                                                             

11:22 Constitutional:  This is a well developed, well nourished patient who is awake, alert,  rn  

      appears anxious Head/Face:  Normocephalic, atraumatic. Eyes:  Periorbital areas with no     

      swelling, redness, or edema. Cardiovascular:  Tachycardic, regular Respiratory:  Mild       

      tachypnea, no retractions, able to slow her breathing down with coaching. Abdomen/GI:       

      Soft, non-tender Skin:  Warm, dry with normal turgor.  Normal color with no rashes, no      

      lesions, and no evidence of cellulitis. MS/ Extremity:  Pulses equal, no cyanosis.          

      Neurovascular intact.  Full, normal range of motion.  Equal circumference. Neuro:           

      Awake and alert, GCS 15, oriented to person, place, time, and situation.  Cranial           

      nerves II-XII grossly intact.  Motor strength 5/5 in all extremities.  Sensory grossly      

      intact.  Cerebellar exam normal.                                                            

11:26 ECG was reviewed by the Attending Physician.                                            rn  

                                                                                                  

Vital Signs:                                                                                      

10:47  / 72; Pulse 180; Resp 22; Temp 97.9(TE); Pulse Ox 100% on R/A; Weight 72.57 kg;  ss  

      Height 5 ft. 5 in. (165.10 cm); Pain 0/10;                                                  

11:11  / 86; Pulse 171;                                                                 ke1 

11:12  / 93; Pulse 122; Resp 20; Pulse Ox 100% ;                                        ke1 

11:13  / 79; Pulse 94;                                                                  ke1 

11:13  / 85; Pulse 102; Resp 19; Pulse Ox 100% on R/A;                                  ke1 

12:06  / 87; Pulse 75; Resp 18;                                                         ke1 

12:50  / 89; Pulse 78; Resp 16 S; Pulse Ox 99% on R/A;                                  jd3 

13:03  / 89; Pulse 83; Resp 16; Pulse Ox 100% on R/A;                                   ke1 

10:47 Body Mass Index 26.63 (72.57 kg, 165.10 cm)                                             ss  

                                                                                                  

MDM:                                                                                              

10:48 Patient medically screened.                                                             rn  

11:02 ED course: Attempted multiple vagal maneuvers without change. Patient reports           rn  

      lightheaded. Vital signs stable at this time. Will attempt adenosine cardioversion..        

11:20 ED course: Pt cardioverted with adenosine, 6mg, one dose, HR now sinus and rate of 95,  rn  

      patient feels much better. Bedside fetal u/s performed, good movement,  and          

      ultrasound activity shown to mother. .                                                      

12:56 Differential diagnosis: arrythmia, dehydration, stress disorder. Data reviewed: vital   rn  

      signs, nurses notes, lab test result(s), EKG, and as a result, I will discharge             

      patient. Counseling: I had a detailed discussion with the patient and/or guardian           

      regarding: the historical points, exam findings, and any diagnostic results supporting      

      the discharge/admit diagnosis, lab results, the need for outpatient follow up, to           

      return to the emergency department if symptoms worsen or persist or if there are any        

      questions or concerns that arise at home. Response to treatment: the patient's symptoms     

      have resolved after treatment, the patient's condition has returned to base line, the       

      patient is now symptom free, and as a result, I will discharge patient. Special             

      discussion: I discussed with the patient/guardian in detail that at this point there is     

      no indication for admission to the hospital. It is understood, however, that if the         

      symptoms persist or worsen the patient needs to return immediately for re-evaluation.       

      Based on the history and exam findings, there is no indication for further emergent         

      testing or inpatient evaluation. I discussed with the patient/guardian the need to see      

      the cardiologist for further evaluation of the symptoms.                                    

                                                                                                  

                                                                                             

11:00 Order name: CBC with Diff                                                               rn  

                                                                                             

11:00 Order name: Basic Metabolic Panel                                                       rn  

                                                                                             

11:00 Order name: TSH                                                                         rn  

                                                                                             

11:00 Order name: T4 Free                                                                     rn  

                                                                                             

11:00 Order name: CBC with Automated Diff; Complete Time: 11:30                               EDMS

                                                                                             

11:00 Order name: Basic Metabolic Panel; Complete Time: 12:08                                 EDMS

                                                                                             

11:00 Order name: IV Start; Complete Time: 11:                                              rn  

                                                                                             

11:00 Order name: Cardiac monitoring; Complete Time: 11:                                    rn  

                                                                                             

11:00 Order name: EKG; Complete Time: 11:                                                   rn  

                                                                                             

11:01 Order name: Thyroid Stimulating Hormone; Complete Time: 12:                           EDMS

                                                                                             

11: Order name: T4 Free; Complete Time: 12:                                               EDMS

                                                                                             

11:00 Order name: O2 Sat Monitoring; Complete Time: 11:                                     rn  

                                                                                             

11:00 Order name: EKG - Nurse/Tech; Complete Time: 11:                                      rn  

                                                                                                  

EC:26 Rate is 170 beats/min. Rhythm is regular. QRS Axis is Normal. CT interval is normal.    rn  

      QRS interval is normal. QT interval is normal. No Q waves. T waves are Normal. No ST        

      changes noted. Clinical impression: SVT. Interpreted by me. Reviewed by me.                 

                                                                                                  

Administered Medications:                                                                         

11: Drug: NS 0.9% 1000 ml Route: IV; Rate: 1000 ml; Site: left antecubital;                 ke1 

12:04 Follow up: IV Status: Completed infusion; IV Intake: 500ml                              ke1 

11:11 Drug: Adenosine 6 mg Route: IVP; Site: left antecubital;                                ke1 

11:13 Follow up:  / 79; Pulse 94 bpm; Response: Marked relief of symptoms; Other        ke1 

11:30 CANCELLED (Other Intervention Used): Adenosine 12 mg IVP once                           ke1 

                                                                                                  

                                                                                                  

Disposition Summary:                                                                              

22 12:57                                                                                    

Discharge Ordered                                                                                 

      Location: Home                                                                          rn  

      Problem: new                                                                            rn  

      Symptoms: have improved                                                                 rn  

      Condition: Stable                                                                       rn  

      Diagnosis                                                                                   

        - Supraventricular tachycardia                                                        rn  

      Followup:                                                                               rn  

        - With: Private Physician                                                                  

        - When: As needed                                                                          

        - Reason: Recheck today's complaints, Re-evaluation by your physician                      

      Discharge Instructions:                                                                     

        - Discharge Summary Sheet                                                             rn  

        - Chemical Cardioversion                                                              rn  

        - Supraventricular Tachycardia, Adult                                                 rn  

      Forms:                                                                                      

        - Medication Reconciliation Form                                                      rn  

        - Thank You Letter                                                                    rn  

        - Antibiotic Education                                                                rn  

        - Prescription Opioid Use                                                             rn  

Signatures:                                                                                       

Dispatcher [employer]                           Vickey Maharaj MD MD rn Smirch, Shelby, RN RN ss Ebrottie, Kouassi, RN                   RN   ke1                                                  

                                                                                                  

Corrections: (The following items were deleted from the chart)                                    

11: 11:01 Adenosine 12 mg IVP once ordered. rn                                              ke1 

11: 11:23 Adenosine 12 mg IVP once given. ke1                                               ke1 

11: 11:30 Adenosine 12 mg IVP once ordered. ke1                                             ke1 

                                                                                                  

**************************************************************************************************

## 2022-02-19 NOTE — EKG
Test Date:    2022-02-18               Test Time:    10:56:56

Technician:   ASHLEIGH                                    

                                                     

MEASUREMENT RESULTS:                                       

Intervals:                                           

Rate:         170                                    

OH:                                                  

QRSD:         72                                     

QT:           276                                    

QTc:          464                                    

Axis:                                                

P:                                                   

OH:                                                  

QRS:          64                                     

T:            -24                                    

                                                     

INTERPRETIVE STATEMENTS:                                       

                                                     

Supraventricular tachycardia

ST & T wave abnormality, consider inferior ischemia

Abnormal ECG

Compared to ECG 09/19/2011 15:26:46

ST (T wave) deviation now present

Possible ischemia now present

Sinus rhythm no longer present

T-wave abnormality no longer present

Prolonged QT interval no longer present



Electronically Signed On 02-19-22 18:17:37 CST by Ignacio Arellano

## 2022-03-21 ENCOUNTER — HOSPITAL ENCOUNTER (EMERGENCY)
Dept: HOSPITAL 97 - ER | Age: 27
Discharge: ANOTHER HOSPITAL | End: 2022-03-21
Payer: COMMERCIAL

## 2022-03-21 VITALS — SYSTOLIC BLOOD PRESSURE: 117 MMHG | DIASTOLIC BLOOD PRESSURE: 90 MMHG

## 2022-03-21 VITALS — OXYGEN SATURATION: 100 %

## 2022-03-21 DIAGNOSIS — Z3A.30: ICD-10-CM

## 2022-03-21 DIAGNOSIS — O99.413: Primary | ICD-10-CM

## 2022-03-21 DIAGNOSIS — O26.893: ICD-10-CM

## 2022-03-21 DIAGNOSIS — I47.1: ICD-10-CM

## 2022-03-21 DIAGNOSIS — Z20.822: ICD-10-CM

## 2022-03-21 DIAGNOSIS — Z88.0: ICD-10-CM

## 2022-03-21 LAB
BUN BLD-MCNC: 10 MG/DL (ref 7–18)
GLUCOSE SERPLBLD-MCNC: 78 MG/DL (ref 74–106)
HCT VFR BLD CALC: 34.1 % (ref 36–45)
LYMPHOCYTES # SPEC AUTO: 2.4 K/UL (ref 0.7–4.9)
PMV BLD: 9.1 FL (ref 7.6–11.3)
POTASSIUM SERPL-SCNC: 3.9 MMOL/L (ref 3.5–5.1)
RBC # BLD: 4.38 M/UL (ref 3.86–4.86)
TROPONIN I SERPL HS-MCNC: 9 PG/ML (ref ?–58.9)
TSH SERPL DL<=0.05 MIU/L-ACNC: 6.27 UIU/ML (ref 0.36–3.74)

## 2022-03-21 PROCEDURE — 99291 CRITICAL CARE FIRST HOUR: CPT

## 2022-03-21 PROCEDURE — 84439 ASSAY OF FREE THYROXINE: CPT

## 2022-03-21 PROCEDURE — 84484 ASSAY OF TROPONIN QUANT: CPT

## 2022-03-21 PROCEDURE — 36415 COLL VENOUS BLD VENIPUNCTURE: CPT

## 2022-03-21 PROCEDURE — 84443 ASSAY THYROID STIM HORMONE: CPT

## 2022-03-21 PROCEDURE — 99292 CRITICAL CARE ADDL 30 MIN: CPT

## 2022-03-21 PROCEDURE — 76815 OB US LIMITED FETUS(S): CPT

## 2022-03-21 PROCEDURE — 85025 COMPLETE CBC W/AUTO DIFF WBC: CPT

## 2022-03-21 PROCEDURE — 80048 BASIC METABOLIC PNL TOTAL CA: CPT

## 2022-03-21 PROCEDURE — 96374 THER/PROPH/DIAG INJ IV PUSH: CPT

## 2022-03-21 PROCEDURE — 93005 ELECTROCARDIOGRAM TRACING: CPT

## 2022-03-21 NOTE — RAD REPORT
EXAM DESCRIPTION:  US - OB Limited - 3/21/2022 12:41 pm

 

CLINICAL HISTORY:  pelvic pain

Abdominal pain

 

COMPARISON:  No comparisons

 

FINDINGS:  This a limited obstetrical sonogram was performed.

 

A single cephalic presenting gestation is identified. Heart rate normal. Normal amniotic fluid volume
. Estimated gestational age is 29 weeks 1 days with estimated date of delivery 06/05/2022.

 

Prominent vessels of are seen anterior to the placenta which is anteriorly positioned. This this rais
es suspicion for possible placenta accreta.

## 2022-03-21 NOTE — ER
Nurse's Notes                                                                                     

 [facility]                                                                 

Name: Marisabel Felix                                                                             

Age: 26 yrs                                                                                       

Sex: Female                                                                                       

: 1995                                                                                   

MRN: [id number]                                                                                   

Arrival Date: 2022                                                                          

Time: 11:40                                                                                       

Account#: [id number]                                                                            

Bed 4                                                                                             

Private MD:                                                                                       

Diagnosis: Supraventricular tachycardia;Abdominal pain, unspecified                               

                                                                                                  

Presentation:                                                                                     

                                                                                             

11:40 Chief complaint: Patient states: Sudden onset of shortness of breath while at work with ss  

      chest tightness and RLQ pain. Pt is 30 weeks pregnant. Was seen and treated in ED for       

      SVT. HR upon arrival is 170. Coronavirus screen: Client denies travel out of the [place]       

      in the last 14 days. Ebola Screen: Patient denies exposure to infectious person.            

      Patient denies travel to an Ebola-affected area in the 21 days before illness onset.        

      Initial Sepsis Screen: Does the patient meet any 2 criteria? No. Patient's initial          

      sepsis screen is negative. Does the patient have a suspected source of infection? No.       

      Patient's initial sepsis screen is negative. Risk Assessment: Do you want to hurt           

      yourself or someone else? Patient reports no desire to harm self or others. Onset of        

      symptoms was 2022.                                                                

11:40 Method Of Arrival: EMS: [facility]                                                  

11:40 Acuity: HALIE 1                                                                           ss  

                                                                                                  

Triage Assessment:                                                                                

11:50 General: Appears uncomfortable, Behavior is cooperative, anxious. Pain: Complains of    ha  

      pain in right lower quadrant. Respiratory: Reports shortness of breath Onset: The           

      symptoms/episode began/occurred this morning, the patient has moderate shortness of         

      breath.                                                                                     

                                                                                                  

OB/GYN:                                                                                           

11:58 LMP 2021                                                                             ha  

                                                                                                  

Historical:                                                                                       

- Allergies:                                                                                      

11:50 PENICILLINS;                                                                            ha  

11:50 tramadol;                                                                               ha  

- PMHx:                                                                                           

11:50 Asthma; Rheumatoid Arthritis;                                                           ha  

                                                                                                  

- Immunization history:: Adult Immunizations up to date.                                          

- Social history:: Smoking status: .                                                              

                                                                                                  

                                                                                                  

Screenin:57 Abuse screen: Denies threats or abuse. Denies injuries from another. Nutritional        ha  

      screening: No deficits noted. Tuberculosis screening: No symptoms or risk factors           

      identified. Fall Risk IV access (20 points).                                                

                                                                                                  

Assessment:                                                                                       

11:58 Cardiovascular: Rhythm is SVT. Respiratory: Airway is patent Respiratory effort is      ha  

      even, Breath sounds are clear bilaterally.                                                  

                                                                                                  

Vital Signs:                                                                                      

11:40  / 90; Pulse 170; Resp 24; Pulse Ox 97% ; Weight 74.84 kg; Height 5 ft. 5 in.     sutton  

      (165.10 cm);                                                                                

11:42  / 72;                                                                            ss  

11:42 Resp 22; Pulse Ox 100% on R/A;                                                          ss  

13:23  / 90; Pulse 96; Resp 18; Pulse Ox 100% on R/A;                                   ha  

11:40 Body Mass Index 27.46 (74.84 kg, 165.10 cm)                                             ha  

                                                                                                  

ED Course:                                                                                        

11:40 Patient arrived in ED.                                                                  ss  

11:42 Triage completed.                                                                       ss  

11:43 Mohsen Slade MD is Attending Physician.                                              kdr 

11:47 Patient has correct armband on for positive identification. Placed in gown. Bed in low  mh5 

      position. Call light in reach. Side rails up X 1. Adult w/ patient. Warm blanket given.     

      Cardiac monitor on. Pulse ox on. NIBP on.                                                   

11:50 Ehsan Briceño PA is PHCP.                                                              [facility] 

11:50 Arm band placed on.                                                                     ha  

11:53 Basic Metabolic Panel Sent.                                                             ha  

11:53 CBC with Diff Sent.                                                                     ha  

11:53 Troponin HS Sent.                                                                       ha  

11:57 No provider procedures requiring assistance completed. Maintain EMS IV. Gauge \T\ site:   ha

      20g LAC.                                                                                    

12:01 L\T\D AT BEDSIDE TO MONITOR FOR FHT AND CONTRACTIONS.                                     [facility]

12:41 OB Limited In Process Unspecified.                                                      EDMS

15:11 Patient did not have IV access during this emergency room visit.                        ha  

                                                                                                  

Administered Medications:                                                                         

11:51 Drug: Adenosine 6 mg Route: IVP; Site: left antecubital;                                ha  

11:51 Follow up: Response: No adverse reaction                                                ha  

                                                                                                  

                                                                                                  

Outcome:                                                                                          

13:53 ER care complete, transfer ordered by MD.                                               [facility] 

15:11 Discharged to home ambulatory.                                                          ha  

15:11 Condition: good                                                                             

15:11 Discharge instructions given to patient, Prescriptions given X 1.                           

15:11 Transferred by ground EMS to [facility].                        ha  

15:11 Condition: stable                                                                           

15:11 Instructed on the need for transfer.                                                        

15:15 ER care complete, transfer ordered by MD.                                               ha  

15:15 Patient left the ED.                                                                    ha  

                                                                                                  

Signatures:                                                                                       

Dispatcher [employer]                           EDMS                                                 

Mohsen Slade MD MD kdr Mickail, Joel, PA PA jmm Smirch, Shelby, RN                      Carina Madison                              [address], [place], RN                   RN   [id number]                                                  

Nadiya Soto RN                  RN   sutton                                                   

                                                                                                  

Corrections: (The following items were deleted from the chart)                                    

11:49 11:40 Pulse 170bpm; john sutton  

                                                                                                  

**************************************************************************************************

## 2022-03-21 NOTE — XMS REPORT
Continuity of Care Document

                            Created on:2022



Patient:DHEERAJ MELARA

Sex:Female

:1995

External Reference #:[id number]





Demographics







                          Address                   [address] [place] [address]

 

                          Home Phone                [phone]

 

                          Work Phone                [phone]

 

                          Mobile Phone              [phone]

 

                          Email Address             [email or web address]

 

                          Preferred Language        English

 

                          Marital Status            Unknown

 

                          Taoism Affiliation     Unknown

 

                          Race                      Unknown

 

                          Additional Race(s)        Unavailable

 

                          Ethnic Group              Unknown









Author







                          Organization              [facility]

t

 

                          Address                   [address]



                                                    [place] [address]

 

                          Phone                     [phone]









Support







                Name            Relationship    Address         Phone

 

                JANET KEITH               [address] [facility] [facility]    [phone]



                                                [place] [address] 

 

                Dwyer        Life Partner    [phone]

[phone]



                                                [place] [address] 









Care Team Providers







                    Name                Role                Phone

 

                    LIBBY HOPPER JR       Primary Care Physician Unavailable

 

                    CAROLINE ADAMES           Attending Clinician Unavailable

 

                    BRIANA          Attending Clinician Unavailable

 

                    BRIANA          Attending Clinician Unavailable

 

                    JEFFERSON                 Attending Clinician Unavailable

 

                    Doctor Unassigned,  Name Attending Clinician Unavailable

 

                    Caroline Adames MD        Attending Clinician [phone]

 

                    Pob,  Lab Main      Attending Clinician Unavailable

 

                    Jazlyn CHING       Attending Clinician [phone]

 

                    Ultrasound          Attending Clinician Unavailable

 

                    INNA Andrade MD      Attending Clinician [phone]

 

                    INNA ANDRADE         Attending Clinician Unavailable









Payers







           Payer Name Policy Type Policy Number Effective Date Expiration Date BLAISE uriarte

 

           [facility] [employer]            [id number]  2021-10-01 00:00:00            







Problems







       Condition Condition Condition Status Onset  Resolution Last   Treating Co

mments 

Source



       Name   Details Category        Date   Date   Treatment Clinician        



                                                 Date                 

 

       Vaginal Vaginal Disease Active                              [facility]



       discharge discharge               3-18                               ity 

of



                                   00:00:                             [address]

 

       Acute  Acute  Disease Active                              [facility]



       lower  lower                3-18                               ity of



       respirator respirator               00:00:                             [place]

[place]



       y      y                    00                                 Medical



       infection infection                                                  Bran

ch

 

       Mild   Mild   Disease Active                              [facility]



       anxiety anxiety               3-18                               ity of



                                   00:00:                             [place]



                                   00                                 Medical



                                                                      Branch

 

       SVT    SVT    Disease Active                              [facility]



       (supravent (supravent               3-04                               it

y of



       ricular ricular               00:00:                             [place]



       tachycardi tachycardi               00                                 Me

dical



       a)     a)                                                      Branch

 

       High risk High risk Disease Active                              Uni

vers



       pregnancy, pregnancy,               1-07                               it

y of



       antepartum antepartum               00:00:                             [place]

[place]



                                   00                                 Medical



                                                                      Branch

 

       History of History of Disease Active 2021                             U

nivers



       anxiety anxiety               0-15                               ity of



                                   00:00:                             [place]



                                   00                                 Medical



                                                                      Branch

 

       Nausea Nausea Disease Active 2021                             [facility]



                                   0-15                               ity of



                                   00:00:                             [place]



                                   00                                 Medical



                                                                      Branch

 

       Moderate Moderate Disease Active                              Unive

rs



       dysplasia dysplasia               3-30                               ity 

of



       of cervix of cervix               00:00:                             [place]

[place]



       (JOSE II) (JOSE II)               00                                 Medica

l



                                                                      Branch

 

       Papanicola Papanicola Disease Active                              U

nivers



       ou smear ou smear               3-23                               ity of



       of cervix of cervix               00:00:                             [place]

[place]



       with high with high               00                                 Medi

marcia



       grade  grade                                                   Branch



       squamous squamous                                                  



       intraepith intraepith                                                  



       elial  elial                                                   



       lesion lesion                                                  



       (HGSIL) (HGSIL)                                                  

 

       Cervical Cervical Disease Active                              Unive

rs



       high risk high risk               3-23                               ity 

of



       human  human                00:00:                             [place]



       papillomav papillomav               00                                 Me

dical



       irus (HPV) irus (HPV)                                                  Br

anch



       DNA test DNA test                                                  



       positive positive                                                  

 

       Asthma Asthma Disease Active 2014                      Overview: Univer

s



                                   0-13                        Formattin ity of



                                   00:00:                      g of this [place]



                                   00                          note   Medical



                                                               might be Branch



                                                               different 



                                                               from the 



                                                               original. 



                                                               ICD10  



                                                               Diagnosis 



                                                               Term   



                                                               Replacer 



                                                               Utility 







Allergies, Adverse Reactions, Alerts







       Allergy Allergy Status Severity Reaction(s) Onset  Inactive Treating Comm

ents 

Source



       Name   Type                        Date   Date   Clinician        

 

       Penicill Propensi Active        Hives  2014                      Univer

s



       ins    ty to                       0-13                        ity of



              adverse                      00:00:                      [place]



              reaction                      00                          Medical



              s                                                       Branch

 

       Tramadol Propensi Active        Anaphylaxis 2014                      U

nivers



              ty to                       0-13                        ity of



              adverse                      00:00:                      [place]



              reaction                      00                          Medical



              s                                                       Branch

 

       PENICILL Drug   Active        Hives  2014                      [facility]



       INS    Class                       0-13                        ity of



                                          00:00:                      [place]



                                          00                          Medical



                                                                      Branch

 

       TRAMADOL DRUG   Active        Anaphylaxis 2014                      Uni

vers



              INGREDI                      0-13                        ity of



                                          00:00:                      [place]



                                          00                          Medical



                                                                      Branch







Social History







           Social Habit Start Date Stop Date  Quantity   Comments   Source

 

           ASSERTION  2021            Pregnant              [facility]



                      00:00:00                                    [facility]

 

           Alcohol intake 2022 Ex-drinker            [facility]



                      00:00:00   00:00:00   (finding)             [facility]

 

           Sex Assigned At 1995                       [facility]

y of



           Birth      00:00:00   00:00:00                         [facility]









                Smoking Status  Start Date      Stop Date       Source

 

                Never smoker                                    [facility]







Medications







       Ordered Filled Start  Stop   Current Ordering Indication Dosage Frequency

 Signature

                    Comments            Components          Source



     Medication Medication Date Date Medication? Clinician                (SIG) 

          



     Name Name                                                   

 

     julia      2022- Yes       [id number] 250mg      Take 1        

   [facility]



     n         3-18 -25                          tablet by           ity of



     (ZITHROMAX      00:00: 04:59                          mouth           [place]



     Z-TALI) 250      00   :00                           daily for           Medi

marcia



     mg tablet                                         6 doses.           [place]

 

     ALBUTEROL            Yes                      Inhale.           Unive

rs



     INHALE      3-04                                              ity of



               12:59:                                              [address]

 

     ALBUTEROL            Yes                      Inhale.           Unive

rs



     INHALE      3-04                                              ity of



               12:59:                                              [address]

 

     ALBUTEROL            Yes                      Inhale.           Unive

rs



     INHALE      3-04                                              ity of



               12:59:                                              [address]

 

     ALBUTEROL            Yes                      Inhale.           Unive

rs



     INHALE      3-04                                              ity of



               12:59:                                              [address]

 

     Nitrofurant            Yes       [id number] 100mg      Take 1         

  [facility]



     oin&Nit.      1-18                               capsule by           ity o

f



     Macrocryst      00:00:                               mouth           [place]



     (MACROBID)      00                                 daily.           Medical



     100 mg                                                        Branch



     capsule                                                        

 

     Nitrofurant      0      Yes       [id number] 100mg      Take 1         

  [facility]



     oin&Nit.      1-18                               capsule by           ity o

f



     Macrocryst      00:00:                               mouth           [place]



     (MACROBID)      00                                 daily.           Medical



     100 mg                                                        Branch



     capsule                                                        

 

     Nitrofurant      0      Yes       [id number] 100mg      Take 1         

  [facility]



     oin&Nit.      1-18                               capsule by           ity o

f



     Macrocryst      00:00:                               mouth           [place]



     (MACROBID)      00                                 daily.           Medical



     100 mg                                                        Branch



     capsule                                                        

 

     Nitrofurant      0      Yes       [id number] 100mg      Take 1         

  [facility]



     oin&Nit.      1-18                               capsule by           ity o

f



     Macrocryst      00:00:                               mouth           [place]



     (MACROBID)      00                                 daily.           Medical



     100 mg                                                        Branch



     capsule                                                        

 

     Nitrofurant      0      Yes       [id number] 100mg      Take 1         

  Univers



     oin&Nit.      1-18                               capsule by           ity o

f



     Macrocryst      00:00:                               mouth           [place]



     (MACROBID)      00                                 daily.           Medical



     100 mg                                                        Branch



     capsule                                                        

 

     Nitrofurant      0      Yes       [id number] 100mg      Take 1         

  Univers



     oin&Nit.      1-18                               capsule by           ity o

f



     Macrocryst      00:00:                               mouth           [place]



     (MACROBID)      00                                 daily.           Medical



     100 mg                                                        Branch



     capsule                                                        

 

     Nitrofurant      0      Yes       [id number] 100mg      Take 1         

  Univers



     oin&Nit.      1-18                               capsule by           ity o

f



     Macrocryst      00:00:                               mouth           [place]



     (MACROBID)      00                                 daily.           Medical



     100 mg                                                        Branch



     capsule                                                        

 

     Nitrofurant      -0      Yes       [id number] 100mg      Take 1         

  Univers



     oin&Nit.      1-18                               capsule by           ity o





     Macrocryst      00:00:                               mouth           [place]



     (MACROBID)      00                                 daily.           Medical



     100 mg                                                        Branch



     capsule                                                        

 

     Nitrofurant      0      Yes       [id number] 100mg      Take 1         

  Univers



     oin&Nit.      1-18                               capsule by           ity o





     Macrocryst      00:00:                               mouth           [place]



     (MACROBID)      00                                 daily.           Medical



     100 mg                                                        Branch



     capsule                                                        

 

     Nitrofurant      0      Yes       [id number] 100mg      Take 1         

  Univers



     oin&Nit.      1-18                               capsule by           ity o





     Macrocryst      00:00:                               mouth           [place]



     (MACROBID)      00                                 daily.           Medical



     100 mg                                                        Branch



     capsule                                                        

 

     Nitrofurant      0      Yes       [id number] 100mg      Take 1         

  Univers



     oin&Nit.      1-18                               capsule by           ity o

f



     Macrocryst      00:00:                               mouth           [place]



     (MACROBID)      00                                 daily.           Medical



     100 mg                                                        Branch



     capsule                                                        

 

     Nitrofurant      2022- Yes       [id number] 100mg      Take 1        

   Univers



     oin&Nit.      1-10 01-18                          capsule by           ity 

of



     Macrocryst      00:00: 05:59                          mouth 2           Fab

as



     (MACROBID)      00   :00                           (two)           Medical



     100 mg                                         times           Branch



     capsule                                         daily for           



                                                  7 days.           

 

     Nitrofurant      -2022- Yes       [id number] 100mg      Take 1        

   Univers



     oin&Nit.      1-10 01-18                          capsule by           ity 

of



     Macrocryst      00:00: 05:59                          mouth 2           Fab

as



     (MACROBID)      00   :00                           (two)           Medical



     100 mg                                         times           Branch



     capsule                                         daily for           



                                                  7 days.           

 

     cephALEXin      2021- No        [id number] 500mg      Take 1          

 [facility]



     500 mg                                capsule by           ity of



     capsule      00:00: 05:59                          mouth 4           [place]



               00   :00                           (four)           Medical



                                                  times           Branch



                                                  daily for           



                                                  7 days.           

 

     cephALEXin      2021- No        [id number] 500mg      Take 1          

 [facility]



     500 mg      -10                          capsule by           ity of



     capsule      00:00: 00:00                          mouth 2           [place]



               00   :00                           (two)           Medical



                                                  times           Branch



                                                  daily.           

 

     prenatal            Yes                      Take  by           Unive

rs



     vit       9-30                               mouth.           ity of



     calc,iron,f      14:37:                                              [place]



     olic      06                                                Medical



     (PRENATAL                                                        Branch



     VITAMIN                                                        



     ORAL)                                                        

 

     prenatal            Yes                      Take  by           Unive

rs



     vit       9-30                               mouth.           ity of



     calc,iron,f      14:37:                                              [place]



     olic      06                                                Medical



     (PRENATAL                                                        Branch



     VITAMIN                                                        



     ORAL)                                                        

 

     prenatal            Yes                      Take  by           Unive

rs



     vit       9-30                               mouth.           ity of



     calc,iron,f      14:37:                                              [place]



     olic      06                                                Medical



     (PRENATAL                                                        Branch



     VITAMIN                                                        



     ORAL)                                                        

 

     prenatal            Yes                      Take  by           Unive

rs



     vit       9-30                               mouth.           ity of



     calc,iron,f      14:37:                                              [place]



     olic      06                                                Medical



     (PRENATAL                                                        Branch



     VITAMIN                                                        



     ORAL)                                                        

 

     prenatal            Yes                      Take  by           Unive

rs



     vit       9-30                               mouth.           ity of



     calc,iron,f      14:37:                                              [place]



     olic      06                                                Medical



     (PRENATAL                                                        Branch



     VITAMIN                                                        



     ORAL)                                                        

 

     prenatal            Yes                      Take  by           Unive

rs



     vit       9-30                               mouth.           ity of



     calc,iron,f      14:37:                                              [place]



     olic      06                                                Medical



     (PRENATAL                                                        Branch



     VITAMIN                                                        



     ORAL)                                                        

 

     prenatal            Yes                      Take  by           Unive

rs



     vit       9-30                               mouth.           ity of



     calc,iron,f      14:37:                                              [place]



     olic      06                                                Medical



     (PRENATAL                                                        Branch



     VITAMIN                                                        



     ORAL)                                                        

 

     prenatal            Yes                      Take  by           Un[facility]

rs



     vit       9-30                               mouth.           ity of



     calc,iron,f      14:37:                                              [place]



     [facility]      06                                                Medical



     (PRENATAL                                                        Branch



     VITAMIN                                                        



     ORAL)                                                        

 

     prenatal            Yes                      Take  by           Unive

rs



     vit       9-30                               mouth.           ity of



     calc,iron,f      14:37:                                              [place]



     [facility]      06                                                Medical



     (PRENATAL                                                        Branch



     VITAMIN                                                        



     ORAL)                                                        

 

     prenatal            Yes                      Take  by           Unive

rs



     vit       9-30                               mouth.           ity of



     calc,iron,f      14:37:                                              [place]



     [facility]      06                                                Medical



     (PRENATAL                                                        Branch



     VITAMIN                                                        



     ORAL)                                                        

 

     prenatal            Yes                      Take  by           Unive

rs



     vit       9-30                               mouth.           ity of



     calc,iron,f      14:37:                                              [place]



     [facility]      06                                                Medical



     (PRENATAL                                                        Branch



     VITAMIN                                                        



     ORAL)                                                        

 

     prenatal            Yes                      Take  by           Unive

rs



     vit       9-30                               mouth.           ity of



     calc,iron,f      14:37:                                              [place]



     [facility]      06                                                Medical



     (PRENATAL                                                        Branch



     VITAMIN                                                        



     ORAL)                                                        

 

     prenatal            Yes                      Take  by           Unive

rs



     vit       9-30                               mouth.           ity of



     calc,iron,f      14:37:                                              [place]



     [facility]      06                                                Medical



     (PRENATAL                                                        Branch



     VITAMIN                                                        



     ORAL)                                                        

 

     prenatal            Yes                      Take  by           Unive

rs



     vit       9-30                               mouth.           ity of



     calc,iron,f      14:37:                                              [address]



     (PRENATAL                                                        Branch



     VITAMIN                                                        



     ORAL)                                                        

 

     prenatal            Yes                      Take  by           Unive

rs



     vit       9-30                               mouth.           ity of



     calc,iron,f      14:37:                                              [address]



     (PRENATAL                                                        Branch



     VITAMIN                                                        



     ORAL)                                                        

 

     ALBUTEROL      0      Yes                      Inhale.           Unive

rs



     INHALE      6-21                                              ity of



               11:26:                                              [address]

 

     ALBUTEROL      [address]      Yes                      Inhale.           Unive

rs



     INHALE      6-21                                              ity of



               11:26:                                              [address]

 

     ALBUTEROL      [address]      Yes                      Inhale.           Unive

rs



     INHALE      6-21                                              ity of



               11:26:                                              [address]

 

     ALBUTEROL      0      Yes                      Inhale.           Unive

rs



     INHALE      6-21                                              ity of



               11:26:                                              [address]

 

     ALBUTEROL      2018-0      Yes                      Inhale.           Unive

rs



     INHALE      6-21                                              ity of



               11:26:                                              [address]

 

     ALBUTEROL      2018-0      Yes                      Inhale.           Unive

rs



     INHALE      6-21                                              ity of



               11:26:                                              [address]

 

     ALBUTEROL      2018-0      Yes                      Inhale.           Unive

rs



     INHALE      6-21                                              ity of



               11:26:                                              [address]

 

     ALBUTEROL      2018-0      Yes                      Inhale.           Unive

rs



     INHALE      6-21                                              ity of



               11:26:                                              [address]

 

     ALBUTEROL      2018-0      Yes                      Inhale.           Unive

rs



     INHALE      6-21                                              ity of



               11:26:                                              [address]

 

     ALBUTEROL      2018-0      Yes                      Inhale.           Unive

rs



     INHALE      6-21                                              ity of



               11:26:                                              [address]

 

     ALBUTEROL      2018-0      Yes                      Inhale.           Unive

rs



     INHALE      6                                              ity of



               11:26:                                              [address]







Immunizations







           Ordered    Filled Immunization Date       Status     Comments   [facility]

e



           Immunization Name Name                                        

 

           TDAP                  2016 Completed             [facility]



                                 00:00:00                         [facility]

 

           Influenza Virus            2016 Completed             Univers[facility]

[facility] of



           Vaccine Quad IM 3+            00:00:00                         [facility]

 

           TDAP                  2016 Completed             [facility]



                                 00:00:00                         [facility]

 

           Influenza Virus            2016 Completed             Universit

y of



           Vaccine Quad IM 3+            00:00:00                         [facility]

 

           TDAP                  2016 Completed             [facility]



                                 00:00:00                         [facility]

 

           Influenza Virus            2016 Completed             Universit

y of



           Vaccine Quad IM 3+            00:00:00                         [facility]                  2016 Completed             [facility]



                                 00:00:00                         [facility]

 

           Influenza Virus            2016 Completed             Universit

y of



           Vaccine Quad IM 3+            00:00:00                         [facility]

 

           TDAP                  2016 Completed             [facility]



                                 00:00:00                         [facility]

 

           Influenza Virus            2016 Completed             Universit

y of



           Vaccine Quad IM 3+            00:00:00                         [facility]

 

           TDAP                  2016 Completed             [facility]



                                 00:00:00                         [facility]

 

           Influenza Virus            2016 Completed             Universit

y of



           Vaccine Quad IM 3+            00:00:00                         [facility]

 

           TDAP                  2016 Completed             [facility]



                                 00:00:00                         [facility]

 

           Influenza Virus            2016 Completed             Universit

y of



           Vaccine Quad IM 3+            00:00:00                         [facility]

 

           TDAP                  2016 Completed             [facility]



                                 00:00:00                         [facility]

 

           Influenza Virus            2016 Completed             Universit

y of



           Vaccine Quad IM 3+            00:00:00                         [facility]

 

           TDAP                  2016 Completed             [facility]



                                 00:00:00                         [facility]

 

           Influenza Virus            2016 Completed             Universit

y of



           Vaccine Quad IM 3+            00:00:00                         [facility]

 

           TDAP                  2016 Completed             [facility]



                                 00:00:00                         [facility]

 

           Influenza Virus            2016 Completed             Universit

y of



           Vaccine Quad IM 3+            00:00:00                         [facility]

 

           TDAP                  2016 Completed             [facility]



                                 00:00:00                         [facility]

 

           Influenza Virus            2016 Completed             Universit

y of



           Vaccine Quad IM 3+            00:00:00                         [facility]

 

           TDAP                  2016 Completed             [facility]



                                 00:00:00                         [facility]

 

           Influenza Virus            2016 Completed             Universit

y of



           Vaccine Quad IM 3+            00:00:00                         [facility]

 

           TDAP                  2016 Completed             [facility]



                                 00:00:00                         [facility]

 

           Influenza Virus            2016 Completed             Universit

y of



           Vaccine Quad IM 3+            00:00:00                         [facility]

 

           TDAP                  2016 Completed             [facility]



                                 00:00:00                         [facility]

 

           Influenza Virus            2016 Completed             Universit

y of



           Vaccine Quad IM 3+            00:00:00                         [facility]

 

           TDAP                  2016 Completed             [facility]



                                 00:00:00                         [facility]

 

           Influenza Virus            2016 Completed             Universit

y of



           Vaccine Quad IM 3+            00:00:00                         [facility]

 

           MMR                   2014-10-15 Completed             [facility]



                                 00:00:00                         [facility]

 

           MMR                   2014-10-15 Completed             [facility]



                                 00:00:00                         [facility]

 

           MMR                   2014-10-15 Completed             [facility]



                                 00:00:00                         [facility]

 

           MMR                   2014-10-15 Completed             [facility]



                                 00:00:00                         [facility]

 

           MMR                   2014-10-15 Completed             [facility]



                                 00:00:00                         [facility]

 

           MMR                   2014-10-15 Completed             [facility]



                                 00:00:00                         [facility]

 

           MMR                   2014-10-15 Completed             [facility]



                                 00:00:00                         [facility]

 

           MMR                   2014-10-15 Completed             [facility]



                                 00:00:00                         [facility]

 

           MMR                   2014-10-15 Completed             [facility]



                                 00:00:00                         [facility]

 

           MMR                   2014-10-15 Completed             [facility]



                                 00:00:00                         [facility]

 

           MMR                   2014-10-15 Completed             [facility]



                                 00:00:00                         [facility]

 

           MMR                   2014-10-15 Completed             [facility]



                                 00:00:00                         [facility]

 

           MMR                   2014-10-15 Completed             [facility]



                                 00:00:00                         [facility]

 

           MMR                   2014-10-15 Completed             [facility]



                                 00:00:00                         [facility]

 

           MMR                   2014-10-15 Completed             [facility]



                                 00:00:00                         [facility]

 

           Td                    2009 Completed             [facility]



                                 00:00:00                         [facility]

 

           Td                    2009 Completed             [facility]



                                 00:00:00                         [facility]



                                                                  [facility]

 

           Td                    2009 Completed             [facility]



                                 00:00:00                         [facility]



                                                                  [facility]

 

           Td                    2009 Completed             [facility]



                                 00:00:00                         [facility]



                                                                  [facility]

 

           Td                    2009 Completed             [facility]



                                 00:00:00                         [facility]



                                                                  [facility]

 

           Td                    2009 Completed             [facility]



                                 00:00:00                         [facility]



                                                                  [facility]

 

           Td                    2009 Completed             [facility]



                                 00:00:00                         [facility]



                                                                  [facility]

 

           Td                    2009 Completed             [facility]



                                 00:00:00                         [facility]



                                                                  [facility]

 

           Td                    2009 Completed             [facility]



                                 00:00:00                         [facility]



                                                                  [facility]

 

           Td                    2009 Completed             [facility]



                                 00:00:00                         [facility]



                                                                  [facility]

 

           Td                    2009 Completed             [facility]



                                 00:00:00                         [facility]



                                                                  [facility]

 

           Td                    2009 Completed             [facility]



                                 00:00:00                         [facility]



                                                                  [facility]

 

           Td                    2009 Completed             [facility]



                                 00:00:00                         [facility]



                                                                  Branch

 

           Td                    2009 Completed             [facility]



                                 00:00:00                         [facility]



                                                                  Branch

 

           Td                    2009 Completed             [facility]



                                 00:00:00                         [facility]







Vital Signs







             Vital Name   Observation Time Observation Value Comments     Source

 

             Systolic blood 2022 15:18:00 103 mm[Hg]                [facility]

[facility] of



             pressure                                            [facility]

 

             Diastolic blood 2022 15:18:00 67 mm[Hg]                 Unive

rsity of



             [facility]

 

             Heart rate   2022 15:18:00 70 /min                   [facility]

 

             Body temperature 2022 15:18:00 36.56 Louise                 [facility]

 

             Respiratory rate 2022 15:18:00 18 /min                   [facility]

 

             Body height  2022 15:18:00 165.1 cm                  [facility]

 

             Body weight  2022 15:18:00 74.844 kg                 [facility]

 

             BMI          2022 15:18:00 27.46 kg/m2               [facility]

 

             Systolic blood 2022 19:07:00 126 mm[Hg]                [facility]

sity of



             pressure                                            [facility]

 

             Diastolic blood 2022 19:07:00 79 mm[Hg]                 Unive

rsity of



             pressure                                            [facility]

 

             Heart rate   2022 19:07:00 96 /min                   [facility]

ty of



                                                                 [facility]



                                                                 [facility]

 

             Body temperature 2022 19:07:00 36.67 Louise                 Univ

ersity of



                                                                 [facility]



                                                                 Branch

 

             Respiratory rate 2022 19:07:00 18 /min                   Univ

ersity of



                                                                 [facility]



                                                                 Branch

 

             Body height  2022 19:07:00 165.1 cm                  Univers[facility]

ty of



                                                                 [facility]



                                                                 [facility]anch

 

             Body weight  2022 19:07:00 75.569 kg                 Univers[facility]

ty of



                                                                 [facility]



                                                                 Branch

 

             BMI          2022 19:07:00 27.72 kg/m2               Univers[facility]

ty of



                                                                 [facility]



                                                                 Branch

 

             Systolic blood 2022 20:04:00 107 mm[Hg]                [facility]

sity of



             pressure                                            [facility]



                                                                 Branch

 

             Diastolic blood 2022 20:04:00 69 mm[Hg]                 Unive

rsity of



             pressure                                            [facility]



                                                                 Branch

 

             Heart rate   2022 20:04:00 66 /min                   [facility]

ty of



                                                                 [facility]



                                                                 Branch

 

             Body temperature 2022 20:04:00 36.44 Louise                 Univ

ersity of



                                                                 [facility]



                                                                 Branch

 

             Respiratory rate 2022 20:04:00 18 /min                   Univ

ersity of



                                                                 [facility]



                                                                 Branch

 

             Body height  2022 20:04:00 165.1 cm                  Univers[facility]

ty of



                                                                 [facility]



                                                                 [facility]anch

 

             Body weight  2022 20:04:00 72.576 kg                 Univers[facility]

ty of



                                                                 [facility]



                                                                 Branch

 

             BMI          2022 20:04:00 26.63 kg/m2               Univers[facility]

ty of



                                                                 [facility]



                                                                 Branch

 

             Systolic blood 2022 20:38:00 110 mm[Hg]                [facility]

sity of



             pressure                                            [facility]



                                                                 Branch

 

             Diastolic blood 2022 20:38:00 73 mm[Hg]                 Unive

rsity of



             pressure                                            [facility]



                                                                 Branch

 

             Heart rate   2022 20:38:00 82 /min                   Universi

ty of



                                                                 [facility]



                                                                 Branch

 

             Body temperature 2022 20:38:00 36.44 Louise                 Univ

ersity of



                                                                 [facility]



                                                                 Branch

 

             Respiratory rate 2022 20:38:00 18 /min                   Univ

ersity of



                                                                 [facility]



                                                                 Branch

 

             Body height  2022 20:38:00 165.1 cm                  Universi

ty of



                                                                 [facility]



                                                                 Branch

 

             Body weight  2022 20:38:00 70.761 kg                 Universi

ty [facility]

 

             BMI          2022 20:38:00 25.96 kg/m2               Universi

ty [facility]

 

             Systolic blood 2021-12-10 18:32:00 111 mm[Hg]                Univer

sity of



             pressure                                            [facility]



                                                                 Branch

 

             Diastolic blood 2021-12-10 18:32:00 74 mm[Hg]                 Unive

rsity of



             pressure                                            [facility]

 

             Heart rate   2021-12-10 18:32:00 60 /min                   Universi

ty [facility]

 

             Body temperature 2021-12-10 18:32:00 36.89 Louise                 [facility]

[facility]

 

             Respiratory rate 2021-12-10 18:32:00 18 /min                   Univ

[facility]

 

             Body height  2021-12-10 18:32:00 165.1 cm                  Universi

ty [facility]

 

             Body weight  2021-12-10 18:32:00 68.629 kg                 Universi

ty [facility]

 

             BMI          2021-12-10 18:32:00 25.18 kg/m2               [facility]

ty [facility]







Procedures







                Procedure       Date / Time     Performing Clinician Source



                                Performed                       

 

                POCT URINALYSIS W/O 2022 15:29:00 [facility]



                SPECIFIC GRAVITY                                 [facility]

 

                EXTERNAL PROVIDER 2022 05:01:00 Doctor Unassigned, No Univ

ersity [facility]



                RECORDS                         Name            Medical Branch

 

                POCT URINALYSIS W/O 2022 20:08:00 Jude Adames   [facility]

[facility] [place]



                SPECIFIC GRAVITY                                 [facility]

 

                EXTERNAL PROVIDER 2022 06:01:00 Doctor Unassigned, No [facility]



                RECORDS                         Name            Medical Branch

 

                2 HR GLUCOSE TOLERANCE 2022 17:40:00 Britany Hobson

[facility]

 

                1 HR GLUCOSE TOLERANCE 2022 16:38:00 [facility] Hob[facility]

[facility]

 

                GLUCOSE FASTING 2022 15:38:00 Britany Hobson o

f [facility]

 

                GLYCOSYLATED HEMOGLOBIN 2022 15:38:00 Britany Hobson

[facility] [place]



                (A1C)                                           [facility]

 

                POCT URINALYSIS W/O 2022 00:00:00 Jude Adames   [facility]



                SPECIFIC GRAVITY                                 [facility]

 

                POCT URINALYSIS W/O 2022 00:00:00 Jude Adames   [facility]

[facility] [place]



                SPECIFIC GRAVITY                                 [facility]

 

                POCT URINALYSIS W/O 2021-12-10 18:38:00 Jude Adames   [facility]

[facility] [place]



                SPECIFIC GRAVITY                                 [facility]







Encounters







        Start   End     Encounter Admission Attending Care    Care    Encounter 

Source



        Date/Time Date/Time Type    Type    Clinicians Facility Department ID   

   

 

        2022 Outpatient R       ALDAIR ADAMESEN [facility]    [id number]

5Q-20 Univers



        14:30:00 14:30:00                                         [id number]  [facility]

 

        2022 Outpatient R       ESEQUIEL WARREN [facility]

B    [id number] 

Univers



        15:00:00 15:00:00                 ESEQUIEL WARREN                 22

0322  [facility]

 

        2022 Outpatient R       ESEQUIEL WARREN [facility]

B    [id number] 

Univers



        15:00:00 15:00:00                 ESEQUIEL WARREN                   

      [facility]

 

        2022 Outpatient R       ESEQUIEL WARREN [facility]

B    [id number] 

Univers



        10:00:00 10:38:46                 ESEQUIEL WARREN                   

      [facility]

 

        2022 Routine         [facility] 1.2.840.114 

[id number] 

Univers



        10:00:00 10:38:46 Prenatal         Esequiel ROJAS 350.1.13.10         i

ty of



                        Visit                   WOMEN'S 4.2.7.2.686         [facility]  [phone]         Medi

marcia



                                                CLINIC  134             Branch

 

        2022 Outpatient R       ESEQUIEL WARREN [facility]

B    [id number] 

Univers



        10:00:00 10:00:00                 ESEQUIEL WARREN                 22

0318  [facility]

 

        2022 Outpatient R       JEFFERSON    [facility]    [id number]

-20 Univers



        15:00:00 15:00:00                 DENNIS                 [id number]  ity o

f



                                                                        [facility]

 

        2022 Orders          Doctor  BONITA    1.2.840.114 [id number]

[id number] Univers



        00:00:00 00:00:00 Only            Unassigned, LAWRENCE   350.1.13.10       

  ity of



                                        [facility] 4.2.7.2.686         Fab

as



                                                        [phone]         [address]

 

        2022 Outpatient R       ROSANGELA [facility]    [id number]

[id number] Univers



        12:45:00 13:30:47                                                 ity of



                                                                        [facility]

 

        2022 Routine         [facility] 1.2.840.114 [id number]

[id number] Univers



        12:45:00 13:30:47 Prenatal         Cam     CRYSTAL 350.1.13.10         i

ty of



                        Visit                   WOMEN'S 4.2.7.2.686         [place]

[place]



                                                HEALTH  [phone]         [address]

 

        2022 Outpatient R       ROSANGELA [facility]    [id number]

5Q-20 Univers



        12:45:00 12:45:00                                         [id number]  ity of



                                                                        [facility]

 

        2022 Orders          Doctor  BONITA    1.2.840.114 [id number]

[id number] Univers



        00:00:00 00:00:00 Only            Unassigned, LAWRENCE   350.1.13.10       

  ity of



                                        [facility] 4.2.7.2.686         Fab

as



                                                        [phone]         [address]

 

        2022 Telephone         Jude Adames [facility]    1.2.840.114 [id number]

[id number] Univers



        00:00:00 00:00:00                 Cam     CLAUDIA 350.1.13.10         i

ty of



                                                [place] 4.2.7.2.686         [place]

[place]



                                                PROFESSIO [phone]         [address]                 

 

        2022 Telephone         Jude Adames [facility]    1.2.840.114 91

[id number] Univers



        00:00:00 00:00:00                 Cam     ANGLEDANNY 350.1.13.10         i

ty of



                                                [place] 4.2.7.2.686         [place]

[place]



                                                PROFESSIO [phone]         Me

dical



                                                NAL     134             [facility]                 

 

        2022 Outpatient R       ROSANGELA [facility]    [id number]

[id number] Univers



        14:00:00 14:35:07                                                 ity of



                                                                        [facility]

 

        2022 Routine         [facility] 1.2.840.114 [id number]

[id number] Univers



        14:00:00 14:35:07 Prenatal         Caroline ROJAS 350.1.13.10         i

ty of



                        Visit                   WOMEN'S 4.2.7.2.686         [place]

[place]



                                                HEALTH  [phone]         [address]

 

        2022 Technician         Perri, [facility]    1.2.8

40.114 [id number] 

Univers



        10:45:00 11:00:00 Visit           Britany Hobson 350.1.13.10  

       ity [facility]



                                                [facility] 4.2.7.2.686         [place]

[place]



                                                PROFESSIO [phone]         Me

dical



                                                NAL     353             [facility]                 

 

        2022 Outpatient R       ROSANGELA [facility]    [id number]

5Q-20 Univers



        10:45:00 10:45:00                                         [id number]  ity [facility]

 

        2022 Telephone         [facility] 1.2.840.114 

[id number] 

Univers



        00:00:00 00:00:00                 Caroline ROJAS 350.1.13.10         it

y of



                                                PEDIATRIC 4.2.7.2.686         [place]

[place]



                                                CLINIC  [phone]         [address]

 

        2022-01-10 2022-01-10 Case            [facility] 1.2.840.114 90

[id number] Univers



        00:00:00 00:00:00 Management         Caroline ROJAS 350.1.13.10        

 ity of



                                                WOMEN'S 4.2.7.2.686         [place]

[place]



                                                HEALTH  [phone]         [address]

 

        2022-01-10 2022-01-10 Telephone         [facility] 1.2.840.114 

[id number] 

Univers



        00:00:00 00:00:00                 Caroline ROJAS 350.1.13.10         it

y of



                                                WOMEN'S 4.2.7.2.686         [place]

[place]



                                                HEALTH  [phone]         [address]

 

        2022 Outpatient R       ROSANGELA JUDE [facility]    [id number]

[id number] Univers



        14:30:00 14:50:20                                                 ity of



                                                                        [facility]

 

        2022 Routine         Rosangela Jude [facility] BAL 1.2.840.114 [id number]

[id number] Univers



        14:30:00 14:50:20 Prenatal         Cam     CRYSTAL 350.1.13.10         i

ty of



                        Visit                   WOMEN'S 4.2.7.2.686         [place]

[place]



                                                HEALTH  [phone]         [address]

 

        2022 Technician         Ultrasound, [facility]    1.2

.840.114 [id number] 

Univers



        14:45:00 15:37:27 Visit           Andrés Andrade [facility]  350.1.13.10 

        ity of



                                                REGIONAL 4.2.7.2.686         Fab

as



                                                MATERNAL [phone]         Med

ical



                                                & CHILD [address] [place] [address]                 

 

        2022 Outpatient R               [facility]    [id number]

-20 Univers



        14:45:00 14:45:00                                         [id number]  ity [facility]

 

        [facility]    [id number]

[id number] Univers



        14:45:00 14:45:00                 ANDRÉS                          [facility]

 

        [facility]            [facility]    1.2.689.733 [id number]

[id number] Univers



        00:00:00 00:00:00 Management         Britany TAYLOR 350.1.13.10       

  ity [facility] 4.2.7.2.686         [place]

s



                                                PROFESSIO [phone]         Me

dical



                                                [address]                 

 

        2021-12-10 2021-12-10 Routine         Jude Adames [facility] VALERIANO 1.2.840.114 [id number]

[id number] Univers



        12:10:28 12:51:16 Prenatal         Caroline ROJAS 350.1.13.10         i

ty of



                        Visit                   WOMEN'S 4.2.7.2.686         [place]

[place]



                                                HEALTH  [phone]         Medi

marcia



                                                CLINIC  134             Branch







Results







           Test Description Test Time  Test Comments Results    Result Comments 

Source









                    POCT URINALYSIS W/O SPECIFIC GRAVITY 2022 15:29:00 









                      Test Item  Value      Reference Range Interpretation Comme

nts









             POCT PH U (test code = 3254) n/a          5-8                      

 

 

             POCT U LEUK EST (test code = 3263) n/a          Negative - Negative

              

 

             POCT U NIT (test code = 3262) n/a          Negative - Negative     

         

 

             POCT U PROT (test code = 3259) Negative     Negative - Negative    

          

 

             POCT U GLU (test code = 3256) Negative     Negative - Negative     

         

 

             POCT U KETONE (test code = 3258) n/a          Negative - Negative  

            

 

             POCT U BLD (test code = 3257) n/a          Negative - Negative     

         



[facility]CIFIC [id number]
 20:09:00





             Test Item    Value        Reference Range Interpretation Comments

 

             POCT PH U (test code = 3254) n/a          5-8                      

 

 

             POCT U LEUK EST (test code = n/a          Negative - Negative      

        



             3263)                                               

 

             POCT U NIT (test code = 3262) n/a          Negative - Negative     

         

 

             POCT U PROT (test code = 3259) Negative     Negative - Negative    

          

 

             POCT U GLU (test code = 3256) Negative     Negative - Negative     

         

 

             POCT U KETONE (test code = 3258) n/a          Negative - Negative  

            

 

             POCT U BLD (test code = 3257) n/a          Negative - Negative     

         

 

             Lab Interpretation (test code = Normal                             

    



             15049-8)                                            



[facility]/O SPECIFIC [id number]
 20:19:00





             Test Item    Value        Reference Range Interpretation Comments

 

             POCT PH U (test code = 3254) n/a          5-8                      

 

 

             POCT U LEUK EST (test code = 3263) n/a          Negative - Negative

              

 

             POCT U NIT (test code = 3262) n/a          Negative - Negative     

         

 

             POCT U PROT (test code = 3259) thien          Negative - Negative    

          

 

             POCT U GLU (test code = 3256) neg          Negative - Negative     

         

 

             POCT U KETONE (test code = 3258) n/a          Negative - Negative  

            

 

             POCT U BLD (test code = 3257) n/a          Negative - Negative     

         



[facility]/O SPECIFIC [id number]
 21:52:00





             Test Item    Value        Reference Range Interpretation Comments

 

             POCT PH U (test code = 3254) N/A          5-8                      

 

 

             POCT U LEUK EST (test code = N/A          Negative - Negative      

        



             3263)                                               

 

             POCT U NIT (test code = 3262) N/A          Negative - Negative     

         

 

             POCT U PROT (test code = 3259) Negative     Negative - Negative    

          

 

             POCT U GLU (test code = 3256) Negative     Negative - Negative     

         

 

             POCT U KETONE (test code = 3258) N/A          Negative - Negative  

            

 

             POCT U BLD (test code = 3257) N/A          Negative - Negative     

         



[facility] SPECIFIC GRAVITY2021-12-10
 18:38:00





             Test Item    Value        Reference Range Interpretation Comments

 

             POCT PH U (test code = 3254) n/a          5-8                      

 

 

             POCT U LEUK EST (test code = n/a          Negative - Negative      

        



             3263)                                               

 

             POCT U NIT (test code = 3262) n/a          Negative - Negative     

         

 

             POCT U PROT (test code = 3259) Negative     Negative - Negative    

          

 

             POCT U GLU (test code = 3256) Negative     Negative - Negative     

         

 

             POCT U KETONE (test code = 3258) n/a          Negative - Negative  

            

 

             POCT U BLD (test code = 3257) n/a          Negative - Negative     

         

 

             Lab Interpretation (test code = Normal                             

    



             52597-9)                                            



[facility]

## 2022-03-21 NOTE — EDPHYS
Physician Documentation                                                                           

 [facility]                                                                 

Name: Marisabel Felix                                                                             

Age: 26 yrs                                                                                       

Sex: Female                                                                                       

: 1995                                                                                   

MRN: [id number]                                                                                   

Arrival Date: 2022                                                                          

Time: 11:40                                                                                       

Account#: [id number]                                                                            

Bed 4                                                                                             

Private MD:                                                                                       

ED Physician Mohsen Slade                                                                       

HPI:                                                                                              

                                                                                             

11:50 This 26 yrs old  Female presents to ER via EMS with complaints of Shortness Of  jmm 

      Breath.                                                                                     

11:50 The patient has shortness of breath at rest. Onset: The symptoms/episode began/occurred jmm 

      gradually, today. Duration: The symptoms are continuous. The patient's shortness of         

      breath is aggravated by nothing, is alleviated by nothing. This is a G2, P1 that            

      presents to the ED with complaints of shortness of breath beginning just prior to           

      arrival along with lower abdominal pain which radiates to her lower back. Denies            

      vomiting, diarrhea, vaginal leakage or bleeding. .                                          

                                                                                                  

OB/GYN:                                                                                           

11:58 LMP 2021                                                                             ha  

                                                                                                  

Historical:                                                                                       

- Allergies:                                                                                      

11:50 PENICILLINS;                                                                            ha  

11:50 tramadol;                                                                               ha  

- PMHx:                                                                                           

11:50 Asthma; Rheumatoid Arthritis;                                                           ha  

                                                                                                  

- Immunization history:: Adult Immunizations up to date.                                          

- Social history:: Smoking status: .                                                              

                                                                                                  

                                                                                                  

ROS:                                                                                              

11:50 Respiratory: Positive for shortness of breath.                                          jmm 

11:50 Abdomen/GI: Positive for abdominal pain.                                                    

11:50 : Positive for pelvic pain, Negative for vaginal bleeding, vaginal discharge.             

11:50 All other systems are negative.                                                             

                                                                                                  

Exam:                                                                                             

11:50 Constitutional:  This is a well developed, well nourished patient who is awake, alert,  jmm 

      and in no acute distress. Head/Face:  atraumatic. Eyes:  EOMI, no conjunctival erythema     

      appreciated ENT:  Moist Mucus Membranes Neck:  Trachea midline, Supple Chest/axilla:        

      Normal chest wall appearance and motion.   Cardiovascular:  Regular rate and rhythm.        

      No edema appreciated Respiratory:  Normal respirations, no respiratory distress             

      appreciated Abdomen/GI:  Non distended, soft Back:  Normal ROM Skin:  General               

      appearance color normal MS/ Extremity:  Moves all extremities, no obvious deformities       

      appreciated, no edema noted to the lower extremities  Neuro:  Awake and alert Psych:        

      Behavior is normal, Mood is normal, Patient is cooperative and pleasant                     

                                                                                                  

Vital Signs:                                                                                      

11:40  / 90; Pulse 170; Resp 24; Pulse Ox 97% ; Weight 74.84 kg; Height 5 ft. 5 in.     [place]  

      (165.10 cm);                                                                                

11:42  / 72;                                                                            ss  

11:42 Resp 22; Pulse Ox 100% on R/A;                                                          ss  

13:23  / 90; Pulse 96; Resp 18; Pulse Ox 100% on R/A;                                   ha  

11:40 Body Mass Index 27.46 (74.84 kg, 165.10 cm)                                             ha  

                                                                                                  

MDM:                                                                                              

11:50 Patient medically screened.                                                             [facility] 

13:49 Data reviewed: vital signs, nurses notes. Counseling: I had a detailed discussion with  katiuska 

      the patient and/or guardian regarding: the historical points, exam findings, and any        

      diagnostic results supporting the discharge/admit diagnosis, lab results, the need to       

      transfer to another facility. ED course: This is a 26-year-old female G2, P1 that           

      presents ED with sudden onset shortness of breath lower abdominal/pelvic pain. Patient      

      presented with a heart rate around 170 bpm. EKG revealed SVT. 6 mg adenosine was            

      administered which did cardiovert the patient. Patient continued to have lower              

      abdominal pain. Labor and delivery evaluated the patient revealed contractions              

      approximately every 3 to 4 minutes. Cervix was checked, is currently closed. Ultrasound     

      did not reveal any acute process. Labs otherwise unremarkable. I discussed the patient      

      with the physician assistant for Dr. Peralta who recommended transfer to [facility]. I      

      discussed the patient with the on-call OB/GYN for [facility] whom accepted the          

      patient to the labor and delivery unit. I discussed this with the family, they              

      initially some apprehension due to the lack of their trusted OB/GYN. Is recommended,        

      the patient would need a NICU in case of a  delivery. Family and patient ended       

      up agreeing with the plan of care..                                                         

                                                                                                  

                                                                                             

11:52 Order name: Basic Metabolic Panel; Complete Time: 12:23                                 [facility] 

                                                                                             

11:52 Order name: CBC with Diff; Complete Time: 12:20                                         [facility] 

                                                                                             

11:52 Order name: Troponin HS; Complete Time: 12:23                                           [facility] 

                                                                                             

11:56 Order name: TSH                                                                         [facility] 

                                                                                             

13:09 Order name: COVID-19 SARS RT PCR (Document "Date of Onset" if Symptomatic); Complete    ss  

      Time: 14:44                                                                                 

                                                                                             

11:52 Order name: EKG; Complete Time: 11:52                                                   [facility] 

                                                                                             

11:52 Order name: Cardiac monitoring; Complete Time: 11:53                                    [facility] 

                                                                                             

11:52 Order name: IV Saline Lock; Complete Time: 11:53                                        [facility] 

                                                                                             

11:52 Order name: Labs collected and sent; Complete Time: 11:53                               [facility] 

                                                                                             

11:52 Order name: O2 Per Protocol; Complete Time: 11:53                                       [facility] 

                                                                                             

11:52 Order name: O2 Sat Monitoring; Complete Time: 11:54                                     [facility] 

                                                                                             

12:20 Order name: OB Limited; Complete Time: 12:59                                            [facility]

                                                                                             

11:57 Order name: Urine Dipstick-Ancillary (obtain specimen)                                  [facility] 

                                                                                                  

Administered Medications:                                                                         

11:51 Drug: Adenosine 6 mg Route: IVP; Site: left antecubital;                                ha  

11:51 Follow up: Response: No adverse reaction                                                ha  

                                                                                                  

                                                                                                  

Disposition:                                                                                      

18:15 Co-signature as Attending Physician, Mohsen Slade MD I agree with the assessment and   kdr 

      plan of care.                                                                               

                                                                                                  

Disposition Summary:                                                                              

22 15:15                                                                                    

Transfer Ordered                                                                                  

      Accepting Physician: xxx(22 15:15)                                                ha  

      [facility]                                          ha  

      Reason: Higher level of care(22 15:15)                                            [place]  

      Condition: Stable(22 15:15)                                                       [place]  

      Diagnosis                                                                                   

        - Supraventricular tachycardia(22 15:15)                                        ha  

        - Abdominal pain, unspecified                                                         [place]  

      Forms:                                                                                      

        - Medication Reconciliation Form                                                      ha  

        - SBAR form                                                                           [place]  

Signatures:                                                                                       

Dispatcher [employer]                           EDMS                                                 

Mohsen Slade MD MD kdr Mickail, Joel, PA PA jmm Au-Stager, Heather RN                  RN   [place]                                                   

                                                                                                  

Corrections: (The following items were deleted from the chart)                                    

12:20 12:19 1st Trimest Single 1st Fetus+US.RAD.BRZ ordered. EDMS                             EDMS

15:11 13:53 Dr. Marquez [facility]                                                                     ha  

15:11 13:53 [facility]                                                                   ha  

15:11 13:53 Higher level of care [facility]                                                          ha  

15:11 13:53 Stable [facility]                                                                        ha  

15:11 13:53 an acute exacerbation [facility]                                                         ha  

15:11 13:53 have improved [facility]                                                                 ha  

15:11 13:53 Lower abdominal pain, unspecified [facility]                                             ha  

15:11 13:53 Supraventricular tachycardia jmm                                                  ha  

                                                                                                  

**************************************************************************************************

## 2022-03-22 NOTE — EKG
Test Date:    2022-03-21               Test Time:    11:40:27

Technician:   JOHNNIE                                     

                                                     

MEASUREMENT RESULTS:                                       

Intervals:                                           

Rate:         164                                    

NH:                                                  

QRSD:         70                                     

QT:           270                                    

QTc:          445                                    

Axis:                                                

P:                                                   

NH:                                                  

QRS:          88                                     

T:            -17                                    

                                                     

INTERPRETIVE STATEMENTS:                                       

                                                     

Supraventricular tachycardia with occasional premature ventricular complexes

Marked ST abnormality, possible inferior subendocardial injury

Abnormal ECG

Compared to ECG 02/18/2022 11:14:15

Ventricular premature complex(es) now present

Sinus tachycardia no longer present

ST (T wave) deviation still present



Electronically Signed On 03-22-22 08:42:29 CDT by Ignacio Arellano

## 2022-03-22 NOTE — EKG
Test Date:    2022-03-21               Test Time:    11:44:00

Technician:   JOHNNIE                                     

                                                     

MEASUREMENT RESULTS:                                       

Intervals:                                           

Rate:         95                                     

SD:           152                                    

QRSD:         70                                     

QT:           344                                    

QTc:          432                                    

Axis:                                                

P:            77                                     

SD:           152                                    

QRS:          89                                     

T:            53                                     

                                                     

INTERPRETIVE STATEMENTS:                                       

                                                     

Normal sinus rhythm

Nonspecific ST abnormality

Abnormal ECG

Compared to ECG 03/21/2022 11:40:27

Supraventricular tachycardia no longer present

Ventricular premature complex(es) no longer present

ST (T wave) deviation still present



Electronically Signed On 03-22-22 08:42:28 CDT by Ignacio Arellano

## 2022-03-22 NOTE — EKG
Test Date:    2022-03-21               Test Time:    14:10:23

Technician:   RHONDA                                     

                                                     

MEASUREMENT RESULTS:                                       

Intervals:                                           

Rate:         104                                    

DC:           122                                    

QRSD:         76                                     

QT:           352                                    

QTc:          462                                    

Axis:                                                

P:            78                                     

DC:           122                                    

QRS:          81                                     

T:            32                                     

                                                     

INTERPRETIVE STATEMENTS:                                       

                                                     

Sinus tachycardia

Otherwise normal ECG

Compared to ECG 02/18/2022 11:14:15

ST (T wave) deviation no longer present



Electronically Signed On 03-22-22 08:42:21 CDT by Ignacio Arellano